# Patient Record
Sex: FEMALE | Race: WHITE | NOT HISPANIC OR LATINO | Employment: OTHER | URBAN - METROPOLITAN AREA
[De-identification: names, ages, dates, MRNs, and addresses within clinical notes are randomized per-mention and may not be internally consistent; named-entity substitution may affect disease eponyms.]

---

## 2023-05-05 LAB
ALANINE AMINOTRANSFERASE (SGPT) (U/L) IN SER/PLAS: 19 U/L (ref 7–45)
ALBUMIN (G/DL) IN SER/PLAS: 3.6 G/DL (ref 3.4–5)
ALKALINE PHOSPHATASE (U/L) IN SER/PLAS: 74 U/L (ref 33–136)
ANION GAP IN SER/PLAS: 11 MMOL/L (ref 10–20)
ASPARTATE AMINOTRANSFERASE (SGOT) (U/L) IN SER/PLAS: 29 U/L (ref 9–39)
BILIRUBIN TOTAL (MG/DL) IN SER/PLAS: 0.5 MG/DL (ref 0–1.2)
CALCIUM (MG/DL) IN SER/PLAS: 8.7 MG/DL (ref 8.6–10.3)
CARBON DIOXIDE, TOTAL (MMOL/L) IN SER/PLAS: 28 MMOL/L (ref 21–32)
CHLORIDE (MMOL/L) IN SER/PLAS: 107 MMOL/L (ref 98–107)
CHOLESTEROL (MG/DL) IN SER/PLAS: 129 MG/DL (ref 0–199)
CHOLESTEROL IN HDL (MG/DL) IN SER/PLAS: 37.6 MG/DL
CHOLESTEROL/HDL RATIO: 3.4
CREATININE (MG/DL) IN SER/PLAS: 0.81 MG/DL (ref 0.5–1.05)
ESTIMATED AVERAGE GLUCOSE FOR HBA1C: 148 MG/DL
GFR FEMALE: 77 ML/MIN/1.73M2
GLUCOSE (MG/DL) IN SER/PLAS: 126 MG/DL (ref 74–99)
HEMOGLOBIN A1C/HEMOGLOBIN TOTAL IN BLOOD: 6.8 %
LDL: 69 MG/DL (ref 0–99)
POTASSIUM (MMOL/L) IN SER/PLAS: 4.2 MMOL/L (ref 3.5–5.3)
PROTEIN TOTAL: 7 G/DL (ref 6.4–8.2)
SODIUM (MMOL/L) IN SER/PLAS: 142 MMOL/L (ref 136–145)
TRIGLYCERIDE (MG/DL) IN SER/PLAS: 111 MG/DL (ref 0–149)
UREA NITROGEN (MG/DL) IN SER/PLAS: 18 MG/DL (ref 6–23)
VLDL: 22 MG/DL (ref 0–40)

## 2023-05-10 PROBLEM — E78.5 DYSLIPIDEMIA, GOAL LDL BELOW 100: Status: ACTIVE | Noted: 2023-05-10

## 2023-05-10 PROBLEM — G56.30 RADIAL NERVE PALSY: Status: ACTIVE | Noted: 2023-05-10

## 2023-05-10 PROBLEM — M94.9 DISORDER OF BONE AND ARTICULAR CARTILAGE: Status: ACTIVE | Noted: 2023-05-10

## 2023-05-10 PROBLEM — F33.2 SEVERE EPISODE OF RECURRENT MAJOR DEPRESSIVE DISORDER, WITHOUT PSYCHOTIC FEATURES (MULTI): Status: ACTIVE | Noted: 2023-05-10

## 2023-05-10 PROBLEM — K74.69 CRYPTOGENIC CIRRHOSIS (MULTI): Status: ACTIVE | Noted: 2023-05-10

## 2023-05-10 PROBLEM — I25.10 ATHEROSCLEROTIC HEART DISEASE OF NATIVE CORONARY ARTERY WITHOUT ANGINA PECTORIS: Status: ACTIVE | Noted: 2023-05-10

## 2023-05-10 PROBLEM — M65.332 TRIGGER MIDDLE FINGER OF LEFT HAND: Status: ACTIVE | Noted: 2023-05-10

## 2023-05-10 PROBLEM — D36.14: Status: ACTIVE | Noted: 2023-05-10

## 2023-05-10 PROBLEM — K21.9 GERD (GASTROESOPHAGEAL REFLUX DISEASE): Status: ACTIVE | Noted: 2023-05-10

## 2023-05-10 PROBLEM — K57.90 DIVERTICULOSIS: Status: ACTIVE | Noted: 2023-05-10

## 2023-05-10 PROBLEM — S50.311A ABRASION OF RIGHT ELBOW: Status: ACTIVE | Noted: 2023-05-10

## 2023-05-10 PROBLEM — I89.0 LYMPHEDEMA OF RIGHT ARM: Status: ACTIVE | Noted: 2023-05-10

## 2023-05-10 PROBLEM — M17.0 PRIMARY OSTEOARTHRITIS OF BOTH KNEES: Status: ACTIVE | Noted: 2023-05-10

## 2023-05-10 PROBLEM — G56.01 CARPAL TUNNEL SYNDROME OF RIGHT WRIST: Status: ACTIVE | Noted: 2023-05-10

## 2023-05-10 PROBLEM — D49.2: Status: ACTIVE | Noted: 2023-05-10

## 2023-05-10 PROBLEM — J45.909 ASTHMATIC BRONCHITIS (HHS-HCC): Status: ACTIVE | Noted: 2023-05-10

## 2023-05-10 PROBLEM — H93.12 TINNITUS OF LEFT EAR: Status: ACTIVE | Noted: 2023-05-10

## 2023-05-10 PROBLEM — I10 BENIGN ESSENTIAL HYPERTENSION: Status: ACTIVE | Noted: 2023-05-10

## 2023-05-10 PROBLEM — G56.31 RADIAL NERVE PALSY, RIGHT: Status: ACTIVE | Noted: 2023-05-10

## 2023-05-10 PROBLEM — E11.65 TYPE 2 DIABETES MELLITUS WITH HYPERGLYCEMIA, WITHOUT LONG-TERM CURRENT USE OF INSULIN (MULTI): Status: ACTIVE | Noted: 2023-05-10

## 2023-05-10 PROBLEM — R91.1 INCIDENTAL LUNG NODULE, > 3MM AND < 8MM: Status: ACTIVE | Noted: 2023-05-10

## 2023-05-10 PROBLEM — R73.02 IGT (IMPAIRED GLUCOSE TOLERANCE): Status: ACTIVE | Noted: 2023-05-10
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
## 2023-05-10 PROBLEM — M89.9 DISORDER OF BONE AND ARTICULAR CARTILAGE: Status: ACTIVE | Noted: 2023-05-10

## 2023-05-10 PROBLEM — S20.211A CONTUSION OF RIB ON RIGHT SIDE: Status: ACTIVE | Noted: 2023-05-10

## 2023-05-10 RX ORDER — ASPIRIN 81 MG/1
1 TABLET ORAL DAILY
COMMUNITY
Start: 2017-02-23

## 2023-05-10 RX ORDER — LIDOCAINE 560 MG/1
1 PATCH PERCUTANEOUS; TOPICAL; TRANSDERMAL DAILY
COMMUNITY
Start: 2023-04-22

## 2023-05-10 RX ORDER — SERTRALINE HYDROCHLORIDE 100 MG/1
1 TABLET, FILM COATED ORAL DAILY
COMMUNITY
Start: 2020-07-09 | End: 2023-07-20 | Stop reason: SDUPTHER

## 2023-05-10 RX ORDER — IRBESARTAN 150 MG/1
1 TABLET ORAL DAILY
COMMUNITY
Start: 2019-10-16 | End: 2023-07-20 | Stop reason: SDUPTHER

## 2023-05-10 RX ORDER — PANTOPRAZOLE SODIUM 40 MG/1
1 TABLET, DELAYED RELEASE ORAL 2 TIMES DAILY
COMMUNITY
Start: 2019-11-14 | End: 2023-07-20 | Stop reason: SDUPTHER

## 2023-05-10 RX ORDER — PRAVASTATIN SODIUM 40 MG/1
1 TABLET ORAL DAILY
COMMUNITY
Start: 2017-03-21 | End: 2023-07-20 | Stop reason: SDUPTHER

## 2023-05-10 RX ORDER — CARVEDILOL 25 MG/1
1 TABLET ORAL
COMMUNITY
Start: 2016-09-17 | End: 2023-07-20 | Stop reason: SDUPTHER

## 2023-05-10 RX ORDER — AMLODIPINE BESYLATE 2.5 MG/1
1 TABLET ORAL DAILY
COMMUNITY
Start: 2018-12-12 | End: 2023-07-20 | Stop reason: SDUPTHER

## 2023-05-10 RX ORDER — DULOXETIN HYDROCHLORIDE 60 MG/1
1 CAPSULE, DELAYED RELEASE ORAL DAILY
COMMUNITY
Start: 2019-11-14 | End: 2023-07-20 | Stop reason: SDUPTHER

## 2023-05-11 ENCOUNTER — OFFICE VISIT (OUTPATIENT)
Dept: PRIMARY CARE | Facility: CLINIC | Age: 73
End: 2023-05-11
Payer: COMMERCIAL

## 2023-05-11 VITALS
SYSTOLIC BLOOD PRESSURE: 110 MMHG | HEART RATE: 64 BPM | DIASTOLIC BLOOD PRESSURE: 70 MMHG | HEIGHT: 59 IN | WEIGHT: 204 LBS | BODY MASS INDEX: 41.12 KG/M2

## 2023-05-11 DIAGNOSIS — E11.65 TYPE 2 DIABETES MELLITUS WITH HYPERGLYCEMIA, WITHOUT LONG-TERM CURRENT USE OF INSULIN (MULTI): Primary | ICD-10-CM

## 2023-05-11 DIAGNOSIS — R91.1 INCIDENTAL LUNG NODULE, > 3MM AND < 8MM: ICD-10-CM

## 2023-05-11 DIAGNOSIS — M81.0 POSTMENOPAUSAL BONE LOSS: ICD-10-CM

## 2023-05-11 DIAGNOSIS — K74.69 CRYPTOGENIC CIRRHOSIS (MULTI): ICD-10-CM

## 2023-05-11 DIAGNOSIS — Z12.31 VISIT FOR SCREENING MAMMOGRAM: ICD-10-CM

## 2023-05-11 DIAGNOSIS — K21.9 GASTROESOPHAGEAL REFLUX DISEASE WITHOUT ESOPHAGITIS: ICD-10-CM

## 2023-05-11 DIAGNOSIS — J45.30 MILD PERSISTENT ASTHMATIC BRONCHITIS WITHOUT COMPLICATION (HHS-HCC): ICD-10-CM

## 2023-05-11 DIAGNOSIS — Z12.11 COLON CANCER SCREENING: ICD-10-CM

## 2023-05-11 DIAGNOSIS — E66.01 CLASS 3 SEVERE OBESITY DUE TO EXCESS CALORIES WITH SERIOUS COMORBIDITY AND BODY MASS INDEX (BMI) OF 40.0 TO 44.9 IN ADULT (MULTI): ICD-10-CM

## 2023-05-11 DIAGNOSIS — F33.2 SEVERE EPISODE OF RECURRENT MAJOR DEPRESSIVE DISORDER, WITHOUT PSYCHOTIC FEATURES (MULTI): ICD-10-CM

## 2023-05-11 DIAGNOSIS — E78.5 DYSLIPIDEMIA, GOAL LDL BELOW 100: ICD-10-CM

## 2023-05-11 DIAGNOSIS — I10 BENIGN ESSENTIAL HYPERTENSION: ICD-10-CM

## 2023-05-11 PROBLEM — M94.9 DISORDER OF BONE AND ARTICULAR CARTILAGE: Status: RESOLVED | Noted: 2023-05-10 | Resolved: 2023-05-11

## 2023-05-11 PROBLEM — E66.813 CLASS 3 SEVERE OBESITY DUE TO EXCESS CALORIES WITH SERIOUS COMORBIDITY AND BODY MASS INDEX (BMI) OF 40.0 TO 44.9 IN ADULT: Status: ACTIVE | Noted: 2023-05-11

## 2023-05-11 PROBLEM — S50.311A ABRASION OF RIGHT ELBOW: Status: RESOLVED | Noted: 2023-05-10 | Resolved: 2023-05-11

## 2023-05-11 PROBLEM — R73.02 IGT (IMPAIRED GLUCOSE TOLERANCE): Status: RESOLVED | Noted: 2023-05-10 | Resolved: 2023-05-11

## 2023-05-11 PROBLEM — S20.211A CONTUSION OF RIB ON RIGHT SIDE: Status: RESOLVED | Noted: 2023-05-10 | Resolved: 2023-05-11

## 2023-05-11 PROBLEM — M89.9 DISORDER OF BONE AND ARTICULAR CARTILAGE: Status: RESOLVED | Noted: 2023-05-10 | Resolved: 2023-05-11
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
## 2023-05-11 PROCEDURE — 1036F TOBACCO NON-USER: CPT | Performed by: INTERNAL MEDICINE

## 2023-05-11 PROCEDURE — 99214 OFFICE O/P EST MOD 30 MIN: CPT | Performed by: INTERNAL MEDICINE

## 2023-05-11 PROCEDURE — 3078F DIAST BP <80 MM HG: CPT | Performed by: INTERNAL MEDICINE

## 2023-05-11 PROCEDURE — 1159F MED LIST DOCD IN RCRD: CPT | Performed by: INTERNAL MEDICINE

## 2023-05-11 PROCEDURE — 1160F RVW MEDS BY RX/DR IN RCRD: CPT | Performed by: INTERNAL MEDICINE

## 2023-05-11 PROCEDURE — 3074F SYST BP LT 130 MM HG: CPT | Performed by: INTERNAL MEDICINE

## 2023-05-11 PROCEDURE — 4010F ACE/ARB THERAPY RXD/TAKEN: CPT | Performed by: INTERNAL MEDICINE

## 2023-05-11 PROCEDURE — 3044F HG A1C LEVEL LT 7.0%: CPT | Performed by: INTERNAL MEDICINE

## 2023-05-11 PROCEDURE — 3008F BODY MASS INDEX DOCD: CPT | Performed by: INTERNAL MEDICINE

## 2023-05-11 ASSESSMENT — ANXIETY QUESTIONNAIRES
GAD7 TOTAL SCORE: 0
3. WORRYING TOO MUCH ABOUT DIFFERENT THINGS: NOT AT ALL
5. BEING SO RESTLESS THAT IT IS HARD TO SIT STILL: NOT AT ALL
6. BECOMING EASILY ANNOYED OR IRRITABLE: NOT AT ALL
IF YOU CHECKED OFF ANY PROBLEMS ON THIS QUESTIONNAIRE, HOW DIFFICULT HAVE THESE PROBLEMS MADE IT FOR YOU TO DO YOUR WORK, TAKE CARE OF THINGS AT HOME, OR GET ALONG WITH OTHER PEOPLE: NOT DIFFICULT AT ALL
1. FEELING NERVOUS, ANXIOUS, OR ON EDGE: NOT AT ALL
7. FEELING AFRAID AS IF SOMETHING AWFUL MIGHT HAPPEN: NOT AT ALL
4. TROUBLE RELAXING: NOT AT ALL
2. NOT BEING ABLE TO STOP OR CONTROL WORRYING: NOT AT ALL

## 2023-05-11 ASSESSMENT — PATIENT HEALTH QUESTIONNAIRE - PHQ9
1. LITTLE INTEREST OR PLEASURE IN DOING THINGS: NOT AT ALL
2. FEELING DOWN, DEPRESSED OR HOPELESS: NOT AT ALL
SUM OF ALL RESPONSES TO PHQ9 QUESTIONS 1 AND 2: 0

## 2023-05-11 ASSESSMENT — COLUMBIA-SUICIDE SEVERITY RATING SCALE - C-SSRS
6. HAVE YOU EVER DONE ANYTHING, STARTED TO DO ANYTHING, OR PREPARED TO DO ANYTHING TO END YOUR LIFE?: NO
2. HAVE YOU ACTUALLY HAD ANY THOUGHTS OF KILLING YOURSELF?: NO
1. IN THE PAST MONTH, HAVE YOU WISHED YOU WERE DEAD OR WISHED YOU COULD GO TO SLEEP AND NOT WAKE UP?: NO

## 2023-05-11 ASSESSMENT — ENCOUNTER SYMPTOMS
LOSS OF SENSATION IN FEET: 0
DEPRESSION: 0
OCCASIONAL FEELINGS OF UNSTEADINESS: 0

## 2023-05-11 NOTE — PROGRESS NOTES
"Subjective   Reason for Visit: Purnima Puente is an 72 y.o. female here for a  visit.     Past Medical, Surgical, and Family History reviewed and updated in chart.    Reviewed all medications by prescribing practitioner or clinical pharmacist (such as prescriptions, OTCs, herbal therapies and supplements) and documented in the medical record.    Patient's  recently admitted to skilled nursing facility after suffering from recurrent stroke with worsening vascular dementia not able to care for self still insists on driving patient in chronic state of depression as difficulty with family contributing to help situation recently sustained a fall at home accidental doing okay at this time was evaluated emergency department on April 24 after fall no significant injuries noted        Patient Care Team:  Chevy Onofre DO as PCP - General  Chevy Onofre DO as PCP - [employer] PCP     Review of Systems   All other systems reviewed and are negative.      Objective   Vitals:  /70   Pulse 64   Ht 1.499 m (4' 11\")   Wt 92.5 kg (204 lb)   BMI 41.20 kg/m²       Physical Exam  Vitals and nursing note reviewed.   Constitutional:       General: She is not in acute distress.     Appearance: Normal appearance. She is well-developed. She is not toxic-appearing.   HENT:      Head: Normocephalic and atraumatic.      Right Ear: Tympanic membrane and external ear normal.      Left Ear: Tympanic membrane and external ear normal.      Nose: Nose normal.      Mouth/Throat:      Mouth: Mucous membranes are moist.      Pharynx: Oropharynx is clear. No oropharyngeal exudate or posterior oropharyngeal erythema.      Tonsils: No tonsillar exudate. 2+ on the right. 2+ on the left.   Eyes:      Extraocular Movements: Extraocular movements intact.      Conjunctiva/sclera: Conjunctivae normal.   Cardiovascular:      Rate and Rhythm: Normal rate and regular rhythm.      Pulses: Normal pulses.      Heart sounds: " Normal heart sounds. No murmur heard.  Pulmonary:      Effort: Pulmonary effort is normal.      Breath sounds: Normal breath sounds.   Abdominal:      General: Abdomen is flat. Bowel sounds are normal.      Palpations: Abdomen is soft.   Musculoskeletal:      Cervical back: Neck supple.   Lymphadenopathy:      Cervical: No cervical adenopathy.   Skin:     General: Skin is warm and dry.      Findings: No rash.   Neurological:      Mental Status: She is alert. Mental status is at baseline.   Psychiatric:         Mood and Affect: Mood normal.         Behavior: Behavior normal.         Thought Content: Thought content normal.         Judgment: Judgment normal.         Assessment/Plan   Problem List Items Addressed This Visit          Respiratory    Asthmatic bronchitis    Relevant Orders    BI mammo bilateral screening tomosynthesis    Follow Up In [facility] - PCP    BI mammo bilateral screening tomosynthesis    XR DEXA bone density    Fecal Occult Blood Immunoassay    Comprehensive Metabolic Panel    Hemoglobin A1C    Lipid Panel    Albumin , Urine Random    Incidental lung nodule, > 3mm and < 8mm    Relevant Orders    BI mammo bilateral screening tomosynthesis    Follow Up In [facility] - PCP    BI mammo bilateral screening tomosynthesis    XR DEXA bone density    Fecal Occult Blood Immunoassay    Comprehensive Metabolic Panel    Hemoglobin A1C    Lipid Panel    Albumin , Urine Random       Circulatory    Benign essential hypertension    Relevant Orders    BI mammo bilateral screening tomosynthesis    Follow Up [facility] - PCP    BI mammo bilateral screening tomosynthesis    XR DEXA bone density    Fecal Occult Blood Immunoassay    Comprehensive Metabolic Panel    Hemoglobin A1C    Lipid Panel    Albumin , Urine Random       Digestive    Cryptogenic cirrhosis (CMS/HCC)    Current Assessment & Plan     No signs of decompensated cirrhosis repeat liver function stable at this time          Relevant Orders    BI mammo bilateral screening tomosynthesis    Follow Up In [facility] - PCP    BI mammo bilateral screening tomosynthesis    XR DEXA bone density    Fecal Occult Blood Immunoassay    Comprehensive Metabolic Panel    Hemoglobin A1C    Lipid Panel    Albumin , Urine Random    GERD (gastroesophageal reflux disease)    Relevant Orders    BI mammo bilateral screening tomosynthesis    Follow Up In [facility] - PCP    BI mammo bilateral screening tomosynthesis    XR DEXA bone density    Fecal Occult Blood Immunoassay    Comprehensive Metabolic Panel    Hemoglobin A1C    Lipid Panel    Albumin , Urine Random       Musculoskeletal    Postmenopausal bone loss    Relevant Orders    BI mammo bilateral screening tomosynthesis    Follow Up In [facility] - PCP    BI mammo bilateral screening tomosynthesis    XR DEXA bone density    Fecal Occult Blood Immunoassay    Comprehensive Metabolic Panel    Hemoglobin A1C    Lipid Panel    Albumin , Urine Random       Endocrine/Metabolic    Type 2 diabetes mellitus with hyperglycemia, without long-term current use of insulin (CMS/[facility]) - Primary    Current Assessment & Plan     Hemoglobin A1c 6.8 continues with diet control at this time reevaluate in 6 months         Relevant Orders    BI mammo bilateral screening tomosynthesis    Follow Up In [facility] - PCP    BI mammo bilateral screening tomosynthesis    XR DEXA bone density    Fecal Occult Blood Immunoassay    Comprehensive Metabolic Panel    Hemoglobin A1C    Lipid Panel    Albumin , Urine Random    Class 3 severe obesity due to excess calories with serious comorbidity and body mass index (BMI) of 40.0 to 44.9 in adult (CMS/[facility])    Current Assessment & Plan     Continue to aggressively work on management of morbid obesity through diet and exercise consider adding GLP-1 agonist therapy if covered in the setting of diabetes mellitus            Other    Dyslipidemia,  goal LDL below 100    Current Assessment & Plan     LDL cholesterol favorable at 69 continue pravastatin 40 mg daily         Relevant Orders    BI mammo bilateral screening tomosynthesis    Follow Up In [facility] - PCP    BI mammo bilateral screening tomosynthesis    XR DEXA bone density    Fecal Occult Blood Immunoassay    Comprehensive Metabolic Panel    Hemoglobin A1C    Lipid Panel    Albumin , Urine Random    Severe episode of recurrent major depressive disorder, without psychotic features (CMS/HCC)    Current Assessment & Plan     Currently in remission on sertraline 100 mg daily         Relevant Orders    BI mammo bilateral screening tomosynthesis    Follow Up In [facility] - PCP    BI mammo bilateral screening tomosynthesis    XR DEXA bone density    Fecal Occult Blood Immunoassay    Comprehensive Metabolic Panel    Hemoglobin A1C    Lipid Panel    Albumin , Urine Random    Visit for screening mammogram    Current Assessment & Plan     Schedule annual screening mammogram         Relevant Orders    BI mammo bilateral screening tomosynthesis    Follow Up In [facility] - PCP    BI mammo bilateral screening tomosynthesis    XR DEXA bone density    Fecal Occult Blood Immunoassay    Comprehensive Metabolic Panel    Hemoglobin A1C    Lipid Panel    Albumin , Urine Random    Colon cancer screening    Current Assessment & Plan     Repeat fit test for colon cancer screening         Relevant Orders    BI mammo bilateral screening tomosynthesis    Follow Up In [facility] - PCP    BI mammo bilateral screening tomosynthesis    XR DEXA bone density    Fecal Occult Blood Immunoassay    Comprehensive Metabolic Panel    Hemoglobin A1C    Lipid Panel    Albumin , Urine Random

## 2023-05-11 NOTE — PROGRESS NOTES
"Subjective   Patient ID: Purnima Puente is a 72 y.o. female who presents for No chief complaint on file..    HPI     Review of Systems    Objective   /70   Pulse 64   Ht 1.499 m (4' 11\")   Wt 92.5 kg (204 lb)   BMI 41.20 kg/m²     Physical Exam    Assessment/Plan   {Assess/PlanSmartLinks:13752}       "

## 2023-05-11 NOTE — PROGRESS NOTES
"Subjective   Patient ID: Purnima Puente is a 72 y.o. female who presents for Follow-up.    HPI     Review of Systems    Objective   /70   Pulse 64   Ht 1.499 m (4' 11\")   Wt 92.5 kg (204 lb)   BMI 41.20 kg/m²     Physical Exam    Assessment/Plan          "

## 2023-05-11 NOTE — ASSESSMENT & PLAN NOTE
Continue to aggressively work on management of morbid obesity through diet and exercise consider adding GLP-1 agonist therapy if covered in the setting of diabetes mellitus

## 2023-07-20 DIAGNOSIS — I10 BENIGN ESSENTIAL HYPERTENSION: Primary | ICD-10-CM

## 2023-07-20 DIAGNOSIS — E78.5 DYSLIPIDEMIA, GOAL LDL BELOW 100: ICD-10-CM

## 2023-07-20 DIAGNOSIS — F33.2 SEVERE EPISODE OF RECURRENT MAJOR DEPRESSIVE DISORDER, WITHOUT PSYCHOTIC FEATURES (MULTI): ICD-10-CM

## 2023-07-20 DIAGNOSIS — K21.9 GASTROESOPHAGEAL REFLUX DISEASE WITHOUT ESOPHAGITIS: ICD-10-CM

## 2023-07-20 RX ORDER — IRBESARTAN 150 MG/1
150 TABLET ORAL DAILY
Qty: 90 TABLET | Refills: 3 | Status: SHIPPED | OUTPATIENT
Start: 2023-07-20

## 2023-07-20 RX ORDER — AMLODIPINE BESYLATE 2.5 MG/1
2.5 TABLET ORAL DAILY
Qty: 90 TABLET | Refills: 3 | Status: SHIPPED | OUTPATIENT
Start: 2023-07-20

## 2023-07-20 RX ORDER — SERTRALINE HYDROCHLORIDE 100 MG/1
100 TABLET, FILM COATED ORAL DAILY
Qty: 90 TABLET | Refills: 3 | Status: SHIPPED | OUTPATIENT
Start: 2023-07-20

## 2023-07-20 RX ORDER — CARVEDILOL 25 MG/1
25 TABLET ORAL
Qty: 180 TABLET | Refills: 3 | Status: SHIPPED | OUTPATIENT
Start: 2023-07-20

## 2023-07-20 RX ORDER — DULOXETIN HYDROCHLORIDE 60 MG/1
60 CAPSULE, DELAYED RELEASE ORAL DAILY
Qty: 90 CAPSULE | Refills: 3 | Status: SHIPPED | OUTPATIENT
Start: 2023-07-20

## 2023-07-20 RX ORDER — PRAVASTATIN SODIUM 40 MG/1
40 TABLET ORAL DAILY
Qty: 90 TABLET | Refills: 3 | Status: SHIPPED | OUTPATIENT
Start: 2023-07-20

## 2023-07-20 RX ORDER — PANTOPRAZOLE SODIUM 40 MG/1
40 TABLET, DELAYED RELEASE ORAL 2 TIMES DAILY
Qty: 180 TABLET | Refills: 3 | Status: SHIPPED | OUTPATIENT
Start: 2023-07-20

## 2023-09-01 ENCOUNTER — CLINICAL SUPPORT (OUTPATIENT)
Dept: PRIMARY CARE | Facility: CLINIC | Age: 73
End: 2023-09-01
Payer: MEDICARE

## 2023-09-01 PROCEDURE — G0008 ADMIN INFLUENZA VIRUS VAC: HCPCS | Performed by: INTERNAL MEDICINE

## 2023-09-01 PROCEDURE — 90662 IIV NO PRSV INCREASED AG IM: CPT | Performed by: INTERNAL MEDICINE

## 2023-09-06 NOTE — PROGRESS NOTES
Subjective   Patient ID: Purnima Puente is a 72 y.o. female who presents for Nurse Visit (Flu shot).    HPI     Review of Systems    Objective   There were no vitals taken for this visit.    Physical Exam    Assessment/Plan

## 2023-11-02 ENCOUNTER — LAB (OUTPATIENT)
Dept: LAB | Facility: LAB | Age: 73
End: 2023-11-02
Payer: MEDICARE

## 2023-11-02 DIAGNOSIS — Z12.11 COLON CANCER SCREENING: ICD-10-CM

## 2023-11-02 DIAGNOSIS — F33.2 SEVERE EPISODE OF RECURRENT MAJOR DEPRESSIVE DISORDER, WITHOUT PSYCHOTIC FEATURES (MULTI): ICD-10-CM

## 2023-11-02 DIAGNOSIS — Z12.31 VISIT FOR SCREENING MAMMOGRAM: ICD-10-CM

## 2023-11-02 DIAGNOSIS — E78.5 DYSLIPIDEMIA, GOAL LDL BELOW 100: ICD-10-CM

## 2023-11-02 DIAGNOSIS — E11.65 TYPE 2 DIABETES MELLITUS WITH HYPERGLYCEMIA, WITHOUT LONG-TERM CURRENT USE OF INSULIN (MULTI): ICD-10-CM

## 2023-11-02 DIAGNOSIS — R91.1 INCIDENTAL LUNG NODULE, > 3MM AND < 8MM: ICD-10-CM

## 2023-11-02 DIAGNOSIS — K21.9 GASTROESOPHAGEAL REFLUX DISEASE WITHOUT ESOPHAGITIS: ICD-10-CM

## 2023-11-02 DIAGNOSIS — I10 BENIGN ESSENTIAL HYPERTENSION: ICD-10-CM

## 2023-11-02 DIAGNOSIS — M81.0 POSTMENOPAUSAL BONE LOSS: ICD-10-CM

## 2023-11-02 DIAGNOSIS — K74.69 CRYPTOGENIC CIRRHOSIS (MULTI): ICD-10-CM

## 2023-11-02 DIAGNOSIS — J45.30 MILD PERSISTENT ASTHMATIC BRONCHITIS WITHOUT COMPLICATION (HHS-HCC): ICD-10-CM

## 2023-11-02 LAB
ALBUMIN SERPL BCP-MCNC: 3.6 G/DL (ref 3.4–5)
ALP SERPL-CCNC: 77 U/L (ref 33–136)
ALT SERPL W P-5'-P-CCNC: 16 U/L (ref 7–45)
ANION GAP SERPL CALC-SCNC: 14 MMOL/L (ref 10–20)
AST SERPL W P-5'-P-CCNC: 27 U/L (ref 9–39)
BILIRUB SERPL-MCNC: 0.5 MG/DL (ref 0–1.2)
BUN SERPL-MCNC: 16 MG/DL (ref 6–23)
CALCIUM SERPL-MCNC: 8.5 MG/DL (ref 8.6–10.3)
CHLORIDE SERPL-SCNC: 105 MMOL/L (ref 98–107)
CHOLEST SERPL-MCNC: 116 MG/DL (ref 0–199)
CHOLESTEROL/HDL RATIO: 3.2
CO2 SERPL-SCNC: 24 MMOL/L (ref 21–32)
CREAT SERPL-MCNC: 0.71 MG/DL (ref 0.5–1.05)
EST. AVERAGE GLUCOSE BLD GHB EST-MCNC: 148 MG/DL
GFR SERPL CREATININE-BSD FRML MDRD: 90 ML/MIN/1.73M*2
GLUCOSE SERPL-MCNC: 108 MG/DL (ref 74–99)
HBA1C MFR BLD: 6.8 %
HDLC SERPL-MCNC: 35.7 MG/DL
LDLC SERPL CALC-MCNC: 56 MG/DL
NON HDL CHOLESTEROL: 80 MG/DL (ref 0–149)
POTASSIUM SERPL-SCNC: 4.7 MMOL/L (ref 3.5–5.3)
PROT SERPL-MCNC: 7.2 G/DL (ref 6.4–8.2)
SODIUM SERPL-SCNC: 138 MMOL/L (ref 136–145)
TRIGL SERPL-MCNC: 123 MG/DL (ref 0–149)
VLDL: 25 MG/DL (ref 0–40)

## 2023-11-02 PROCEDURE — 80053 COMPREHEN METABOLIC PANEL: CPT

## 2023-11-02 PROCEDURE — 36415 COLL VENOUS BLD VENIPUNCTURE: CPT

## 2023-11-02 PROCEDURE — 83036 HEMOGLOBIN GLYCOSYLATED A1C: CPT

## 2023-11-02 PROCEDURE — 80061 LIPID PANEL: CPT

## 2023-11-13 ENCOUNTER — OFFICE VISIT (OUTPATIENT)
Dept: PRIMARY CARE | Facility: CLINIC | Age: 73
End: 2023-11-13
Payer: MEDICARE

## 2023-11-13 VITALS
HEIGHT: 59 IN | HEART RATE: 68 BPM | BODY MASS INDEX: 40.72 KG/M2 | WEIGHT: 202 LBS | DIASTOLIC BLOOD PRESSURE: 80 MMHG | SYSTOLIC BLOOD PRESSURE: 122 MMHG

## 2023-11-13 DIAGNOSIS — K21.9 GASTROESOPHAGEAL REFLUX DISEASE WITHOUT ESOPHAGITIS: ICD-10-CM

## 2023-11-13 DIAGNOSIS — R91.1 INCIDENTAL LUNG NODULE, > 3MM AND < 8MM: ICD-10-CM

## 2023-11-13 DIAGNOSIS — J45.30 MILD PERSISTENT ASTHMATIC BRONCHITIS WITHOUT COMPLICATION (HHS-HCC): ICD-10-CM

## 2023-11-13 DIAGNOSIS — K74.69 CRYPTOGENIC CIRRHOSIS (MULTI): ICD-10-CM

## 2023-11-13 DIAGNOSIS — Z12.31 VISIT FOR SCREENING MAMMOGRAM: ICD-10-CM

## 2023-11-13 DIAGNOSIS — E78.5 DYSLIPIDEMIA, GOAL LDL BELOW 100: ICD-10-CM

## 2023-11-13 DIAGNOSIS — E66.01 CLASS 3 SEVERE OBESITY DUE TO EXCESS CALORIES WITH SERIOUS COMORBIDITY AND BODY MASS INDEX (BMI) OF 40.0 TO 44.9 IN ADULT (MULTI): ICD-10-CM

## 2023-11-13 DIAGNOSIS — I10 BENIGN ESSENTIAL HYPERTENSION: ICD-10-CM

## 2023-11-13 DIAGNOSIS — D36.14 SCHWANNOMA OF NERVE OF CHEST: ICD-10-CM

## 2023-11-13 DIAGNOSIS — E11.65 TYPE 2 DIABETES MELLITUS WITH HYPERGLYCEMIA, WITHOUT LONG-TERM CURRENT USE OF INSULIN (MULTI): Primary | ICD-10-CM

## 2023-11-13 DIAGNOSIS — R91.1 INCIDENTAL PULMONARY NODULE, GREATER THAN OR EQUAL TO 8MM: ICD-10-CM

## 2023-11-13 DIAGNOSIS — F33.2 SEVERE EPISODE OF RECURRENT MAJOR DEPRESSIVE DISORDER, WITHOUT PSYCHOTIC FEATURES (MULTI): ICD-10-CM

## 2023-11-13 PROCEDURE — 1159F MED LIST DOCD IN RCRD: CPT | Performed by: INTERNAL MEDICINE

## 2023-11-13 PROCEDURE — 3079F DIAST BP 80-89 MM HG: CPT | Performed by: INTERNAL MEDICINE

## 2023-11-13 PROCEDURE — 3074F SYST BP LT 130 MM HG: CPT | Performed by: INTERNAL MEDICINE

## 2023-11-13 PROCEDURE — 99214 OFFICE O/P EST MOD 30 MIN: CPT | Performed by: INTERNAL MEDICINE

## 2023-11-13 PROCEDURE — 1036F TOBACCO NON-USER: CPT | Performed by: INTERNAL MEDICINE

## 2023-11-13 PROCEDURE — 3008F BODY MASS INDEX DOCD: CPT | Performed by: INTERNAL MEDICINE

## 2023-11-13 PROCEDURE — 1160F RVW MEDS BY RX/DR IN RCRD: CPT | Performed by: INTERNAL MEDICINE

## 2023-11-13 PROCEDURE — 3048F LDL-C <100 MG/DL: CPT | Performed by: INTERNAL MEDICINE

## 2023-11-13 PROCEDURE — 3044F HG A1C LEVEL LT 7.0%: CPT | Performed by: INTERNAL MEDICINE

## 2023-11-13 PROCEDURE — 4010F ACE/ARB THERAPY RXD/TAKEN: CPT | Performed by: INTERNAL MEDICINE

## 2023-11-13 ASSESSMENT — PATIENT HEALTH QUESTIONNAIRE - PHQ9
6. FEELING BAD ABOUT YOURSELF - OR THAT YOU ARE A FAILURE OR HAVE LET YOURSELF OR YOUR FAMILY DOWN: NEARLY EVERY DAY
SUM OF ALL RESPONSES TO PHQ9 QUESTIONS 1 AND 2: 3
2. FEELING DOWN, DEPRESSED OR HOPELESS: NEARLY EVERY DAY
1. LITTLE INTEREST OR PLEASURE IN DOING THINGS: NOT AT ALL
10. IF YOU CHECKED OFF ANY PROBLEMS, HOW DIFFICULT HAVE THESE PROBLEMS MADE IT FOR YOU TO DO YOUR WORK, TAKE CARE OF THINGS AT HOME, OR GET ALONG WITH OTHER PEOPLE: SOMEWHAT DIFFICULT
5. POOR APPETITE OR OVEREATING: NEARLY EVERY DAY
SUM OF ALL RESPONSES TO PHQ QUESTIONS 1-9: 19
3. TROUBLE FALLING OR STAYING ASLEEP OR SLEEPING TOO MUCH: NEARLY EVERY DAY
8. MOVING OR SPEAKING SO SLOWLY THAT OTHER PEOPLE COULD HAVE NOTICED. OR THE OPPOSITE, BEING SO FIGETY OR RESTLESS THAT YOU HAVE BEEN MOVING AROUND A LOT MORE THAN USUAL: NOT AT ALL
9. THOUGHTS THAT YOU WOULD BE BETTER OFF DEAD, OR OF HURTING YOURSELF: SEVERAL DAYS
7. TROUBLE CONCENTRATING ON THINGS, SUCH AS READING THE NEWSPAPER OR WATCHING TELEVISION: NEARLY EVERY DAY
4. FEELING TIRED OR HAVING LITTLE ENERGY: NEARLY EVERY DAY

## 2023-11-13 ASSESSMENT — ANXIETY QUESTIONNAIRES
2. NOT BEING ABLE TO STOP OR CONTROL WORRYING: SEVERAL DAYS
GAD7 TOTAL SCORE: 12
7. FEELING AFRAID AS IF SOMETHING AWFUL MIGHT HAPPEN: MORE THAN HALF THE DAYS
1. FEELING NERVOUS, ANXIOUS, OR ON EDGE: NEARLY EVERY DAY
6. BECOMING EASILY ANNOYED OR IRRITABLE: NEARLY EVERY DAY
IF YOU CHECKED OFF ANY PROBLEMS ON THIS QUESTIONNAIRE, HOW DIFFICULT HAVE THESE PROBLEMS MADE IT FOR YOU TO DO YOUR WORK, TAKE CARE OF THINGS AT HOME, OR GET ALONG WITH OTHER PEOPLE: SOMEWHAT DIFFICULT
3. WORRYING TOO MUCH ABOUT DIFFERENT THINGS: SEVERAL DAYS
4. TROUBLE RELAXING: SEVERAL DAYS
5. BEING SO RESTLESS THAT IT IS HARD TO SIT STILL: SEVERAL DAYS

## 2023-11-13 NOTE — ASSESSMENT & PLAN NOTE
Blood pressures well controlled continueCarvedilol 25 mg twice a day with amlodipine 2.5 mg daily and irbesartan 150 mg daily

## 2023-11-13 NOTE — ASSESSMENT & PLAN NOTE
Recent stress test and echocardiogram reveals no evidence of ischemia continue to monitor with cardiologist

## 2023-11-13 NOTE — ASSESSMENT & PLAN NOTE
LDL cholesterol optimal at 56 with HDL 35 and triglyceride level of 123 continue pravastatin 40 mg daily

## 2023-11-13 NOTE — ASSESSMENT & PLAN NOTE
2 pound weight loss since last visit BMI of 40 work to achieve weight goal of 195 pounds through diet lifestyle changes

## 2023-11-13 NOTE — PATIENT INSTRUCTIONS

## 2023-11-13 NOTE — PROGRESS NOTES
"Subjective   Patient ID: Purnima Puente is a 73 y.o. female who presents for Follow-up.    HPI     Review of Systems    Objective   /80 (BP Location: Right arm, Patient Position: Sitting)   Pulse 68   Ht 1.499 m (4' 11\")   Wt 91.6 kg (202 lb)   BMI 40.80 kg/m²     Physical Exam    Assessment/Plan          "

## 2023-11-13 NOTE — PROGRESS NOTES
"Subjective   Reason for Visit: Purnima Puente is an 73 y.o. female here for a  visit.     Past Medical, Surgical, and Family History reviewed and updated in chart.    Reviewed all medications by prescribing practitioner or clinical pharmacist (such as prescriptions, OTCs, herbal therapies and supplements) and documented in the medical record.    HPI    Patient Care Team:  Chevy Onofre DO as PCP - General  Chevy Onofre DO as PCP - [employer] PCP     Review of Systems   All other systems reviewed and are negative.      Objective   Vitals:  /80 (BP Location: Right arm, Patient Position: Sitting)   Pulse 68   Ht 1.499 m (4' 11\")   Wt 91.6 kg (202 lb)   BMI 40.80 kg/m²       Physical Exam  Vitals and nursing note reviewed.   Constitutional:       General: She is not in acute distress.     Appearance: Normal appearance. She is well-developed. She is obese. She is not toxic-appearing.   HENT:      Head: Normocephalic and atraumatic.      Right Ear: Tympanic membrane and external ear normal.      Left Ear: Tympanic membrane and external ear normal.      Nose: Nose normal.      Mouth/Throat:      Mouth: Mucous membranes are moist.      Pharynx: Oropharynx is clear. No oropharyngeal exudate or posterior oropharyngeal erythema.      Tonsils: No tonsillar exudate. 2+ on the right. 2+ on the left.   Eyes:      Extraocular Movements: Extraocular movements intact.      Conjunctiva/sclera: Conjunctivae normal.   Cardiovascular:      Rate and Rhythm: Normal rate and regular rhythm.      Pulses: Normal pulses.      Heart sounds: Normal heart sounds. No murmur heard.  Pulmonary:      Effort: Pulmonary effort is normal.      Breath sounds: Normal breath sounds.   Abdominal:      General: Abdomen is flat. Bowel sounds are normal.      Palpations: Abdomen is soft.   Musculoskeletal:      Cervical back: Neck supple.   Lymphadenopathy:      Cervical: No cervical adenopathy.   Skin:     General: " Skin is warm and dry.      Findings: No rash.   Neurological:      Mental Status: She is alert. Mental status is at baseline.   Psychiatric:         Mood and Affect: Mood normal.         Behavior: Behavior normal.         Thought Content: Thought content normal.         Judgment: Judgment normal.         Assessment/Plan   Problem List Items Addressed This Visit       Asthmatic bronchitis    Current Assessment & Plan     Symptoms well controlled continue         Relevant Orders    Follow Up In [facility] - PCP - Established    Comprehensive Metabolic Panel    Hemoglobin A1C    Lipid Panel    Albumin , Urine Random    Benign essential hypertension    Current Assessment & Plan     Blood pressures well controlled continueCarvedilol 25 mg twice a day with amlodipine 2.5 mg daily and irbesartan 150 mg daily         Relevant Orders    Follow Up In [facility] - PCP - Established    Comprehensive Metabolic Panel    Hemoglobin A1C    Lipid Panel    Albumin , Urine Random    Cryptogenic cirrhosis (CMS/HCC)    Current Assessment & Plan     No signs of symptoms of decompensated cirrhosis or elevated liver functions at this time         Relevant Orders    Follow Up In [facility] - PCP - Established    Comprehensive Metabolic Panel    Hemoglobin A1C    Lipid Panel    Albumin , Urine Random    Dyslipidemia, goal LDL below 100    Current Assessment & Plan     LDL cholesterol optimal at 56 with HDL 35 and triglyceride level of 123 continue pravastatin 40 mg daily         Relevant Orders    Follow Up In [facility] - PCP - Established    Comprehensive Metabolic Panel    Hemoglobin A1C    Lipid Panel    Albumin , Urine Random    GERD (gastroesophageal reflux disease)    Relevant Orders    Follow Up In [facility] - PCP - Established    Comprehensive Metabolic Panel    Hemoglobin A1C    Lipid Panel    Albumin , Urine Random    Incidental pulmonary nodule, greater than or equal to 8mm     Current Assessment & Plan     Last evaluated stable in May 2020 2 repeat study with noncontrast CT         Relevant Orders    CT chest wo IV contrast    Schwannoma of nerve of chest    Relevant Orders    CT chest wo IV contrast    Severe episode of recurrent major depressive disorder, without psychotic features (CMS/[facility])    Relevant Orders    Follow Up In [facility] - PCP - Established    Comprehensive Metabolic Panel    Hemoglobin A1C    Lipid Panel    Albumin , Urine Random    Type 2 diabetes mellitus with hyperglycemia, without long-term current use of insulin (CMS/[facility]) - Primary    Current Assessment & Plan     Hemoglobin A1c improved to 6.8 with fasting blood sugar of 108 continue to manage with diet control at this time         Relevant Orders    Follow Up In [facility] - PCP - Established    Comprehensive Metabolic Panel    Hemoglobin A1C    Lipid Panel    Albumin , Urine Random    Visit for screening mammogram    Current Assessment & Plan     Mammogram completed in August stable continue with annual follow-up         Relevant Orders    Follow Up In [facility] - PCP - Established    Comprehensive Metabolic Panel    Hemoglobin A1C    Lipid Panel    Albumin , Urine Random    Class 3 severe obesity due to excess calories with serious comorbidity and body mass index (BMI) of 40.0 to 44.9 in adult (CMS/[facility])    Current Assessment & Plan     2 pound weight loss since last visit BMI of 40 work to achieve weight goal of 195 pounds through diet lifestyle changes             Patient was identified as a fall risk. Risk prevention instructions provided.

## 2023-11-13 NOTE — ASSESSMENT & PLAN NOTE
Hemoglobin A1c improved to 6.8 with fasting blood sugar of 108 continue to manage with diet control at this time

## 2024-03-15 ENCOUNTER — LAB (OUTPATIENT)
Dept: LAB | Facility: LAB | Age: 74
End: 2024-03-15
Payer: COMMERCIAL

## 2024-03-15 ENCOUNTER — TELEPHONE (OUTPATIENT)
Dept: CARDIOLOGY | Facility: CLINIC | Age: 74
End: 2024-03-15

## 2024-03-15 DIAGNOSIS — F33.2 SEVERE EPISODE OF RECURRENT MAJOR DEPRESSIVE DISORDER, WITHOUT PSYCHOTIC FEATURES (MULTI): ICD-10-CM

## 2024-03-15 DIAGNOSIS — Z12.31 VISIT FOR SCREENING MAMMOGRAM: ICD-10-CM

## 2024-03-15 DIAGNOSIS — J45.30 MILD PERSISTENT ASTHMATIC BRONCHITIS WITHOUT COMPLICATION (HHS-HCC): ICD-10-CM

## 2024-03-15 DIAGNOSIS — R91.1 INCIDENTAL LUNG NODULE, > 3MM AND < 8MM: ICD-10-CM

## 2024-03-15 DIAGNOSIS — K21.9 GASTROESOPHAGEAL REFLUX DISEASE WITHOUT ESOPHAGITIS: ICD-10-CM

## 2024-03-15 DIAGNOSIS — I10 BENIGN ESSENTIAL HYPERTENSION: Primary | ICD-10-CM

## 2024-03-15 DIAGNOSIS — K74.69 CRYPTOGENIC CIRRHOSIS (MULTI): ICD-10-CM

## 2024-03-15 DIAGNOSIS — E78.5 DYSLIPIDEMIA, GOAL LDL BELOW 100: ICD-10-CM

## 2024-03-15 DIAGNOSIS — E11.65 TYPE 2 DIABETES MELLITUS WITH HYPERGLYCEMIA, WITHOUT LONG-TERM CURRENT USE OF INSULIN (MULTI): ICD-10-CM

## 2024-03-15 DIAGNOSIS — I10 BENIGN ESSENTIAL HYPERTENSION: ICD-10-CM

## 2024-03-15 NOTE — TELEPHONE ENCOUNTER
3/15/24  1148  After reviewing Dr. Turner' last office note placed order for a BMP and mg++.      Called and informed patient of labs ordered and to have them done 1-2 days before her appt with Dr. Butts.      Patient verbalized understanding.        ----- Message from Janell Espinoza sent at 3/15/2024 11:16 AM EDT -----  Regarding: Blood work orders  Can you put blood work orders in  if need. She has paperwork for 03/15/24.  Please let her know when done [phone].

## 2024-03-17 NOTE — PROGRESS NOTES
[facility]    Patient Name: Purnima Puente  Patient : 1950      Scribe Attestation  By signing my name below, INina Scribe   attest that this documentation has been prepared under the direction and in the presence of Jesu Butts DO.      Reason for visit:  This is a 73-year-old female here for follow-up regarding her history of moderate coronary artery disease as seen on cardiac catheterization done in May 2015, hypertension, dyslipidemia, and severe obesity.      HPI:  This is a 73-year-old female here for follow-up regarding her history of moderate coronary artery disease as seen on cardiac catheterization done in May 2015, hypertension, dyslipidemia, and severe obesity.  The patient was last evaluated by me in 2023.  At that visit I suggested a long-acting nitrate which the patient declined, ordered blood work including BMP/magnesium be drawn in 6 months, and asked the patient to follow-up in 6 months and sooner if necessary.  BMP done in 2024 showed normal serum sodium and potassium with a serum creatinine 0.90, serum magnesium was 1.77. Hemoglobin A1c done in 2023 was 6.8%.  Lipid panel done in 2023 showed an LDL cholesterol 56 and triglycerides of 123 while on pravastatin 40 mg daily. Nuclear stress done in 2023 showed a small fixed perfusion defect in the apical wall which resolved on prone imaging suggesting soft tissue attenuation, low probability of ischemia, calculated ejection fraction of 53%. Echocardiogram done in 2023 showed low normal left ventricular systolic function with an ejection fraction of 50 to 55%, normal right ventricular systolic function, mild dilatation of the ascending aorta measured at 4.0 cm, and no significant valve abnormalities. ECG done today showed sinus rhythm with a heart rate of 87 bpm. The patient reports intermittent lower extremity edema. She denies  any aggravating factors to her edema. She also has some shortness of breath which had been unchanged since the last visit. She denies any new chest pain, palpitations and lightheadedness.  She states that she takes all of her medications as prescribed. During my exam, she was resting comfortably on the exam table.            Assessment/Plan:   1. Coronary artery disease  The patient has a history of moderate coronary artery disease as seen on cardiac catheterization done in May 2015.  ECG done today showed sinus rhythm with a heart rate of 87 bpm.  She denies anginal chest dicomfort but reports some shortness of breath which is unchanged from the last visit.  Shortness of breath appears to be multifactorial which includes aging, weight gain and deconditioning.   Blood pressure appears controlled on exam today.   She should continue current antihypertensive medications and antiplatelet therapy.  Nuclear stress done in August 2023 showed a small fixed perfusion defect in the apical wall which resolved on prone imaging suggesting soft tissue attenuation, low probability of ischemia, calculated ejection fraction of 53%.   Echocardiogram done in August 2023 showed low normal left ventricular systolic function with an ejection fraction of 50 to 55%, normal right ventricular systolic function, mild dilatation of the ascending aorta measured at 4.0 cm, and no significant valve abnormalities.  Recent lab works as noted in the HPI.  Lipid panel done in November 2023 showed an LDL cholesterol 56 and triglycerides of 123 while on pravastatin 40 mg daily.  Echocardiogram will be updated in 6 months.  Please see lifestyle recommendations below.  Follow up in 1 year and sooner if necessary.     2.  Thoracic ascending aortic aneurysm  The patient was noted to have thoracic ascending aortic aneurysm seen on recent echocardiogram.  Echocardiogram done in August 2023 showed low normal left ventricular systolic function with an  ejection fraction of 50 to 55%, normal right ventricular systolic function, mild dilatation of the ascending aorta measured at 4.0 cm, and no significant valve abnormalities.  Echocardiogram will be updated in 6 months.    3. Hypertension  The patient has a history of hypertension and blood pressure appears controlled on exam today.   She should continue her current antihypertensive medications.      4. Dyslipidemia  Lipid panel done in November 2023 showed an LDL cholesterol 56 and triglycerides of 123 while on pravastatin 40 mg daily.  Please see lifestyle recommendations below.      5. Severe obesity  Please see lifestyle recommendations below.      6. Lower extremity edema  The patient reports having intermittent lower extremity edema.  She was noted to trace pitting bilateral lower extremity edema on exam today.  I discussed with her the importance of following a low-sodium heart healthy diet as well as weight loss, wearing compression stockings and elevating legs when seated.        Orders:   Echocardiogram in 6 months   Follow-up in 1 year.     Lifestyle Recommendations  I recommend a whole-food plant-based diet, an eating pattern that encourages the consumption of unrefined plant foods (such as fruits, vegetables, tubers, whole grains, legumes, nuts and seeds) and discourages meats, dairy products, eggs and processed foods.     The AHA/ACC recommends that the patient consume a dietary pattern that emphasizes intake of vegetables, fruits, and whole grains; includes low-fat dairy products, poultry, fish, legumes, non-tropical vegetable oils, and nuts; and limits intake of sodium, sweets, sugar-sweetened beverages, and red meats.  Adapt this dietary pattern to appropriate calorie requirements (a 500-750 kcal/day deficit to loose weight), personal and cultural food preferences, and nutrition therapy for other medical conditions (including diabetes).  Achieve this pattern by following plans such as the [place]  Mediterranean, DASH dietary pattern, or AHA diet.     Engage in 2 hours and 30 minutes per week of moderate-intensity physical activity, or 1 hour and 15 minutes (75 minutes) per week of vigorous-intensity aerobic physical activity, or an equivalent combination of moderate and vigorous-intensity aerobic physical activity. Aerobic activity should be performed in episodes of at least 10 minutes preferably spread throughout the week.     Adhering to a heart healthy diet, regular exercise habits, avoidance of tobacco products, and maintenance of a healthy weight are crucial components of their heart disease risk reduction.     Any positive review of systems not specifically addressed in the office visit today should be evaluated and treated by the patients primary care physician or in an emergency department if necessary     Patient was notified that results from ordered tests will be called to the patient if it changes current management; it will otherwise be discussed at a future appointment and available on  [email or web address].     Thank you for allowing me to participate in the care of this patient.        This document was generated using the assistance of voice recognition software. If there are any errors of spelling, grammar, syntax, or meaning; please feel free to contact me directly for clarification.    Past Medical History:  She has a past medical history of Allergic contact dermatitis due to other chemical products (03/08/2021), Asthma, Calculus of bile duct with chronic cholecystitis without obstruction (02/11/2021), Calculus of bile duct without cholangitis or cholecystitis without obstruction (02/04/2021), Chronic obstructive pulmonary disease with (acute) exacerbation (CMS/[facility]) (07/09/2020), Diverticulitis of intestine, part unspecified, without perforation or abscess without bleeding (04/02/2020), Encounter for general adult medical examination without abnormal findings (11/14/2019), Encounter for general adult  medical examination without abnormal findings (04/06/2022), Encounter for other preprocedural examination (09/18/2020), Left ventricular failure, unspecified (CMS/HCC) (10/16/2019), Lesion of radial nerve, right upper limb (07/29/2022), Localized swelling, mass and lump, right upper limb (10/27/2020), Morbid (severe) obesity due to excess calories (CMS/HCC) (06/09/2021), Other symptoms and signs involving the genitourinary system (09/18/2020), Other symptoms and signs involving the musculoskeletal system (09/02/2021), Pain in right elbow (07/24/2020), Pain in right shoulder (01/25/2021), Personal history of neoplasm of uncertain behavior (07/24/2020), Personal history of other diseases of the circulatory system, Personal history of other diseases of the circulatory system, Personal history of other diseases of the circulatory system, Personal history of other diseases of the circulatory system, Personal history of other diseases of the nervous system and sense organs, Personal history of other diseases of the respiratory system, Personal history of other endocrine, nutritional and metabolic disease (09/15/2020), Personal history of other specified conditions (02/04/2021), Personal history of other specified conditions (04/02/2020), Personal history of other specified conditions (01/08/2021), Personal history of other specified conditions (10/31/2017), Personal history of urinary (tract) infections (09/08/2020), Portal vein thrombosis (02/11/2021), Portal vein thrombosis (02/04/2021), Primary pulmonary hypertension (CMS/HCC) (02/04/2021), Rash and other nonspecific skin eruption (01/08/2021), Right upper quadrant abdominal tenderness (01/08/2021), Spondylosis without myelopathy or radiculopathy, cervical region, Stiffness of right shoulder, not elsewhere classified (02/02/2021), Strain of unspecified achilles tendon, initial encounter, Syncope and collapse, and Urinary tract infection, site not specified  (09/11/2020).    Past Surgical History:  She has a past surgical history that includes Hysterectomy (02/23/2017); Bladder surgery (02/23/2017); Other surgical history (09/29/2021); Other surgical history (07/24/2020); Other surgical history (10/28/2020); Other surgical history (06/09/2021); Other surgical history (08/06/2020); Other surgical history (11/14/2019); and Other surgical history (11/14/2019).      Social History:  She reports that she has never smoked. She has never used smokeless tobacco. She reports that she does not currently use alcohol. She reports that she does not use drugs.    Family History:  Family History   Problem Relation Name Age of Onset    Coronary artery disease Other      Breast cancer Other      Heart attack Other      Psoriasis Other      Other (CABG) Other      Colonic polyp Other          Allergies:  Diphenhydramine hcl, Ciprofloxacin, Phenytoin sodium extended, Sulfa (sulfonamide antibiotics), Amitriptyline, Bupropion, Chlordiazepoxide, Doxycycline, Enoxaparin, Erythromycin, Heparin, Hydromorphone, Hydroxyzine pamoate, Latex, Nitrofurantoin macrocrystal, Penicillins, Statins-hmg-coa reductase inhibitors, Sumatriptan, and Terbinafine    Outpatient Medications:  Current Outpatient Medications   Medication Instructions    amLODIPine (NORVASC) 2.5 mg, oral, Daily    aspirin 81 mg EC tablet 1 tablet, oral, Daily    carvedilol (COREG) 25 mg, oral, 2 times daily with meals    DULoxetine (CYMBALTA) 60 mg, oral, Daily    irbesartan (AVAPRO) 150 mg, oral, Daily    lidocaine 4 % patch 1 patch, transdermal, Daily    pantoprazole (PROTONIX) 40 mg, oral, 2 times daily    pravastatin (PRAVACHOL) 40 mg, oral, Daily    sertraline (ZOLOFT) 100 mg, oral, Daily        ROS:  A 14 point review of systems was done and is negative other than as stated in HPI    Vitals:      7/29/2022    10:21 AM 10/24/2022     9:41 AM 1/30/2023    10:19 AM 5/11/2023    12:16 PM 8/3/2023    10:11 AM 9/15/2023    11:53 AM  "11/13/2023    10:35 AM   Vitals   Systolic 116  110 110 108 124 122   Diastolic 74  60 70 70 66 80   Heart Rate 70  74 64 82 83 68   Resp 16  16       Height (in) 1.499 m (4' 11\") 1.499 m (4' 11\") 1.499 m (4' 11\") 1.499 m (4' 11\") 1.499 m (4' 11\") 1.499 m (4' 11\") 1.499 m (4' 11\")   Weight (lb) 191 191 196 204 199.13 200.38 202   BMI 38.58 kg/m2 38.58 kg/m2 39.59 kg/m2 41.2 kg/m2 40.22 kg/m2 40.47 kg/m2 40.8 kg/m2   BSA (m2) 1.9 m2 1.9 m2 1.92 m2 1.96 m2 1.94 m2 1.95 m2 1.95 m2        Physical Exam:   Constitutional: Cooperative, in no acute distress, alert, appears stated age.   Skin: Skin color, texture, turgor normal. No rashes or lesions.   Head: Normocephalic. No masses, lesions, tenderness or abnormalities   Eyes: Extraocular movements are grossly intact.   Mouth and throat: Mucous membranes moist   Neck: Neck supple, no carotid bruits, no JVD   Respiratory: Lungs clear to auscultation, no wheezing or rhonchi, no use of accessory muscles   Chest wall: No scars, normal excursion with respiration   Cardiovascular: Regular rhythm without murmur, gallop, or rubs   Gastrointestinal: Abdomen soft, nontender. Bowel sounds normal. Severely obese.  Musculoskeletal: Strength equal in upper extremities   Extremities: Trace pitting edema   Neurologic: Sensation grossly intact, alert and oriented x3        Intake/Output:   No intake/output data recorded.    Outpatient Medications  Current Outpatient Medications on File Prior to Visit   Medication Sig Dispense Refill    amLODIPine (Norvasc) 2.5 mg tablet Take 1 tablet (2.5 mg) by mouth once daily. 90 tablet 3    aspirin 81 mg EC tablet Take 1 tablet (81 mg) by mouth once daily.      carvedilol (Coreg) 25 mg tablet Take 1 tablet (25 mg) by mouth 2 times a day with meals. 180 tablet 3    DULoxetine (Cymbalta) 60 mg DR capsule Take 1 capsule (60 mg) by mouth once daily. 90 capsule 3    irbesartan (Avapro) 150 mg tablet Take 1 tablet (150 mg) by mouth once daily. 90 tablet 3    " lidocaine 4 % patch Place 1 patch on the skin once daily.      pantoprazole (ProtoNix) 40 mg EC tablet Take 1 tablet (40 mg) by mouth 2 times a day. 180 tablet 3    pravastatin (Pravachol) 40 mg tablet Take 1 tablet (40 mg) by mouth once daily. 90 tablet 3    sertraline (Zoloft) 100 mg tablet Take 1 tablet (100 mg) by mouth once daily. 90 tablet 3     No current facility-administered medications on file prior to visit.       Labs: (past 26 weeks)  Recent Results (from the past 4368 hour(s))   Comprehensive Metabolic Panel    Collection Time: 11/02/23 11:24 AM   Result Value Ref Range    Glucose 108 (H) 74 - 99 mg/dL    Sodium 138 136 - 145 mmol/L    Potassium 4.7 3.5 - 5.3 mmol/L    Chloride 105 98 - 107 mmol/L    Bicarbonate 24 21 - 32 mmol/L    Anion Gap 14 10 - 20 mmol/L    Urea Nitrogen 16 6 - 23 mg/dL    Creatinine 0.71 0.50 - 1.05 mg/dL    eGFR 90 >60 mL/min/1.73m*2    Calcium 8.5 (L) 8.6 - 10.3 mg/dL    Albumin 3.6 3.4 - 5.0 g/dL    Alkaline Phosphatase 77 33 - 136 U/L    Total Protein 7.2 6.4 - 8.2 g/dL    AST 27 9 - 39 U/L    Bilirubin, Total 0.5 0.0 - 1.2 mg/dL    ALT 16 7 - 45 U/L   Hemoglobin A1C    Collection Time: 11/02/23 11:24 AM   Result Value Ref Range    Hemoglobin A1C 6.8 (H) see below %    Estimated Average Glucose 148 Not Established mg/dL   Lipid Panel    Collection Time: 11/02/23 11:24 AM   Result Value Ref Range    Cholesterol 116 0 - 199 mg/dL    HDL-Cholesterol 35.7 mg/dL    Cholesterol/HDL Ratio 3.2     LDL Calculated 56 <=99 mg/dL    VLDL 25 0 - 40 mg/dL    Triglycerides 123 0 - 149 mg/dL    Non HDL Cholesterol 80 0 - 149 mg/dL       ECG  No results found for this or any previous visit (from the past 4464 hour(s)).    Echocardiogram  No results found for this or any previous visit from the past 1095 days.      CV Studies:  EKG: No results found for this or any previous visit (from the past 4464 hour(s)).  Echocardiogram:   Echocardiogram     Narrative  [facility]  [address]  [place] [id number]  Phone [phone] Fax [phone]    TRANSTHORACIC ECHOCARDIOGRAM REPORT      Patient Name:     CHARLIE MEZA           Gilda Physician:  [id number] [address] JIMENEZ  Study Date:       8/29/2023            Referring           SARA CALDWELL  Physician:  MRN/PID:          [id number]             PCP:  Accession/Order#: [id number]         [facility] Echo Lab  Location:  YOB: 1950           Fellow:  Gender:           F                    Nurse:              Marya Collado RN  Admit Date:                            Sonographer:        Gilda Hernandez [facility]  Admission Status: Outpatient           Additional Staff:  Height:           149.86 cm            CC Report to:  Weight:           90.26 kg             Study Type:         Echocardiogram  BSA:              1.84 m2  Blood Pressure: 108 /70 mmHg    Diagnosis/ICD: I25.10-Atherosclerotic heart disease of native coronary artery  without angina pectoris; I10-Essential (primary) hypertension  Indication:    CAD,HTN  Procedure/CPT: Echo Complete w Full Doppler-[id number]    Patient History:  Pertinent History: CAD.    Study Detail: The following Echo studies were performed: 2D, M-Mode, Doppler and  color flow. Technically challenging study due to body habitus and  prominent lung artifact. Optison used as a contrast agent for  endocardial border definition.      PHYSICIAN INTERPRETATION:  Left Ventricle: Left ventricular systolic function is normal, with an estimated ejection fraction of 50-55%. There are no regional wall motion abnormalities. The left ventricular cavity size is normal. Spectral Doppler shows a normal pattern of left ventricular diastolic filling.  Left Atrium: The left atrium is normal in size.  Right Ventricle: The right ventricle is normal in size. There is normal right ventricular global systolic function.  Right Atrium: The right atrium is normal in size.  Aortic Valve: The aortic valve  appears structurally normal. There is no evidence of aortic valve regurgitation. The peak instantaneous gradient of the aortic valve is 9.8 mmHg. The mean gradient of the aortic valve is 5.0 mmHg.  Mitral Valve: The mitral valve is normal in structure. There is no evidence of mitral valve regurgitation.  Tricuspid Valve: The tricuspid valve is structurally normal. There is trace tricuspid regurgitation.  Pulmonic Valve: The pulmonic valve is not well visualized. There is trace pulmonic valve regurgitation.  Pericardium: There is no pericardial effusion noted.  Aorta: The aortic root is normal. There is mild dilatation of the ascending aorta.      CONCLUSIONS:  1. Left ventricular systolic function is normal with a 50-55% estimated ejection fraction.    QUANTITATIVE DATA SUMMARY:  2D MEASUREMENTS:  Normal Ranges:  LAs:           4.07 cm    (2.7-4.0cm)  IVSd:          1.12 cm    (0.6-1.1cm)  LVPWd:         0.95 cm    (0.6-1.1cm)  LVIDd:         5.01 cm    (3.9-5.9cm)  LVIDs:         3.82 cm  LV Mass Index: 104.3 g/m2  LV % FS        23.6 %    LA VOLUME:  Normal Ranges:  LA Vol A4C:        50.3 ml    (22+/-6mL/m2)  LA Vol A2C:        28.2 ml  LA Vol BP:         41.8 ml  LA Vol Index A4C:  27.3 ml/m2  LA Vol Index A2C:  15.3 ml/m2  LA Vol Index BP:   22.7 ml/m2  LA Area A4C:       19.3 cm2  LA Area A2C:       13.0 cm2  LA Major Axis A4C: 6.3 cm  LA Major Axis A2C: 5.1 cm  LA Vol A4C:        49.4 ml  LA Vol A2C:        26.1 ml    RA VOLUME BY A/L METHOD:  Normal Ranges:  RA Area A4C: 13.0 cm2    AORTA MEASUREMENTS:  Normal Ranges:  Ao Sinus, d: 3.00 cm (2.1-3.5cm)  Ao STJ, d:   2.20 cm (1.7-3.4cm)  Asc Ao, d:   4.00 cm (2.1-3.4cm)    LV SYSTOLIC FUNCTION BY 2D PLANIMETRY (MOD):  Normal Ranges:  EF-A4C View: 50.9 % (>=55%)  EF-A2C View: 43.8 %  EF-Biplane:  48.0 %    LV DIASTOLIC FUNCTION:  Normal Ranges:  MV Peak E:    0.94 m/s    (0.7-1.2 m/s)  MV Peak A:    0.94 m/s    (0.42-0.7 m/s)  E/A Ratio:    1.00         (1.0-2.2)  MV e'         0.06 m/s    (>8.0)  MV lateral e' 0.06 m/s  MV medial e'  0.07 m/s  MV A Dur:     134.16 msec  E/e' Ratio:   14.44       (<8.0)    MITRAL VALVE:  Normal Ranges:  MV DT: 221 msec (150-240msec)    AORTIC VALVE:  Normal Ranges:  AoV Vmax:                1.56 m/s (<=1.7m/s)  AoV Peak P.8 mmHg (<20mmHg)  AoV Mean P.0 mmHg (1.7-11.5mmHg)  LVOT Max Terrence:            1.18 m/s (<=1.1m/s)  AoV VTI:                 32.34 cm (18-25cm)  LVOT VTI:                26.14 cm  LVOT Diameter:           1.96 cm  (1.8-2.4cm)  AoV Area, VTI:           2.44 cm2 (2.5-5.5cm2)  AoV Area,Vmax:           2.28 cm2 (2.5-4.5cm2)  AoV Dimensionless Index: 0.81      RIGHT VENTRICLE:  RV Basal 3.58 cm  RV Mid   2.50 cm  RV Major 7.6 cm  TAPSE:   20.6 mm  RV s'    0.12 m/s    TRICUSPID VALVE/RVSP:  Normal Ranges:  IVC Diam: 1.14 cm  TV e'     0.1 m/s    AORTA:  Asc Ao Diam 3.97 cm      [id number] Homer Gage MD  Electronically signed on 2023 at 9:58:52 AM         Final     Stress Testing IMESULT([id number]): No results found for this or any previous visit from the past 1825 days.    Cardiac Catheterization: No results found for this or any previous visit from the past  days.  No results found for this or any previous visit from the past 3650 days.     Cardiac Scoring: No results found for this or any previous visit from the past  days.    AAA : No results found for this or any previous visit from the past 1825 days.    OTHER: No results found for this or any previous visit from the past  days.    LAST IMAGING RESULTS  ELECTROCARDIOGRAM RHYTHM STRIP  Ordered by an unspecified provider.    Problem List Items Addressed This Visit       Atherosclerotic heart disease of native coronary artery without angina pectoris - Primary    Relevant Orders    ECG 12 Lead (Completed)    Benign essential hypertension    Relevant Orders    ECG 12 Lead (Completed)    Dyslipidemia, goal LDL below 100     Relevant Orders    ECG 12 Lead (Completed)    Class 3 severe obesity due to excess calories with serious comorbidity and body mass index (BMI) of 40.0 to 44.9 in adult (CMS/[facility])    Relevant Orders    ECG 12 Lead (Completed)    Thoracic ascending aortic aneurysm (CMS/[facility])    Relevant Orders    ECG 12 Lead (Completed)    Bilateral lower extremity edema     Other Visit Diagnoses       Lower extremity edema                    Jesu Butts DO, FACC, FACOI

## 2024-03-18 ENCOUNTER — LAB (OUTPATIENT)
Dept: LAB | Facility: LAB | Age: 74
End: 2024-03-18
Payer: COMMERCIAL

## 2024-03-18 DIAGNOSIS — I10 BENIGN ESSENTIAL HYPERTENSION: ICD-10-CM

## 2024-03-18 LAB
ANION GAP SERPL CALC-SCNC: 10 MMOL/L (ref 10–20)
BUN SERPL-MCNC: 13 MG/DL (ref 6–23)
CALCIUM SERPL-MCNC: 8.4 MG/DL (ref 8.6–10.3)
CHLORIDE SERPL-SCNC: 104 MMOL/L (ref 98–107)
CO2 SERPL-SCNC: 29 MMOL/L (ref 21–32)
CREAT SERPL-MCNC: 0.9 MG/DL (ref 0.5–1.05)
EGFRCR SERPLBLD CKD-EPI 2021: 68 ML/MIN/1.73M*2
GLUCOSE SERPL-MCNC: 159 MG/DL (ref 74–99)
MAGNESIUM SERPL-MCNC: 1.77 MG/DL (ref 1.6–2.4)
POTASSIUM SERPL-SCNC: 4.3 MMOL/L (ref 3.5–5.3)
SODIUM SERPL-SCNC: 139 MMOL/L (ref 136–145)

## 2024-03-18 PROCEDURE — 80048 BASIC METABOLIC PNL TOTAL CA: CPT

## 2024-03-18 PROCEDURE — 83735 ASSAY OF MAGNESIUM: CPT

## 2024-03-18 PROCEDURE — 36415 COLL VENOUS BLD VENIPUNCTURE: CPT

## 2024-03-22 ENCOUNTER — OFFICE VISIT (OUTPATIENT)
Dept: CARDIOLOGY | Facility: CLINIC | Age: 74
End: 2024-03-22
Payer: COMMERCIAL

## 2024-03-22 VITALS
WEIGHT: 197.2 LBS | HEART RATE: 87 BPM | HEIGHT: 59 IN | BODY MASS INDEX: 39.76 KG/M2 | SYSTOLIC BLOOD PRESSURE: 124 MMHG | DIASTOLIC BLOOD PRESSURE: 72 MMHG

## 2024-03-22 DIAGNOSIS — R60.0 LOWER EXTREMITY EDEMA: ICD-10-CM

## 2024-03-22 DIAGNOSIS — I25.10 ATHEROSCLEROSIS OF NATIVE CORONARY ARTERY OF NATIVE HEART WITHOUT ANGINA PECTORIS: Primary | ICD-10-CM

## 2024-03-22 DIAGNOSIS — R60.0 BILATERAL LOWER EXTREMITY EDEMA: ICD-10-CM

## 2024-03-22 DIAGNOSIS — I71.21 ANEURYSM OF ASCENDING AORTA WITHOUT RUPTURE (CMS-HCC): ICD-10-CM

## 2024-03-22 DIAGNOSIS — I10 BENIGN ESSENTIAL HYPERTENSION: ICD-10-CM

## 2024-03-22 DIAGNOSIS — E78.5 DYSLIPIDEMIA, GOAL LDL BELOW 100: ICD-10-CM

## 2024-03-22 DIAGNOSIS — E66.01 CLASS 3 SEVERE OBESITY DUE TO EXCESS CALORIES WITH SERIOUS COMORBIDITY AND BODY MASS INDEX (BMI) OF 40.0 TO 44.9 IN ADULT (MULTI): ICD-10-CM

## 2024-03-22 PROCEDURE — 4010F ACE/ARB THERAPY RXD/TAKEN: CPT | Performed by: INTERNAL MEDICINE

## 2024-03-22 PROCEDURE — 1036F TOBACCO NON-USER: CPT | Performed by: INTERNAL MEDICINE

## 2024-03-22 PROCEDURE — 99214 OFFICE O/P EST MOD 30 MIN: CPT | Performed by: INTERNAL MEDICINE

## 2024-03-22 PROCEDURE — 3078F DIAST BP <80 MM HG: CPT | Performed by: INTERNAL MEDICINE

## 2024-03-22 PROCEDURE — 3008F BODY MASS INDEX DOCD: CPT | Performed by: INTERNAL MEDICINE

## 2024-03-22 PROCEDURE — 1159F MED LIST DOCD IN RCRD: CPT | Performed by: INTERNAL MEDICINE

## 2024-03-22 PROCEDURE — 93000 ELECTROCARDIOGRAM COMPLETE: CPT | Performed by: INTERNAL MEDICINE

## 2024-03-22 PROCEDURE — 3074F SYST BP LT 130 MM HG: CPT | Performed by: INTERNAL MEDICINE

## 2024-03-22 RX ORDER — MELOXICAM 15 MG/1
TABLET ORAL
COMMUNITY
Start: 2019-11-14

## 2024-03-22 RX ORDER — LISINOPRIL 10 MG/1
TABLET ORAL EVERY 12 HOURS
COMMUNITY
End: 2024-05-13 | Stop reason: WASHOUT

## 2024-03-22 RX ORDER — BUDESONIDE AND FORMOTEROL FUMARATE DIHYDRATE 160; 4.5 UG/1; UG/1
AEROSOL RESPIRATORY (INHALATION) EVERY 12 HOURS
COMMUNITY
Start: 2020-06-11

## 2024-03-22 RX ORDER — CITALOPRAM 10 MG/1
TABLET ORAL EVERY 24 HOURS
COMMUNITY

## 2024-04-07 ENCOUNTER — HOSPITAL ENCOUNTER (OUTPATIENT)
Dept: RADIOLOGY | Facility: HOSPITAL | Age: 74
Discharge: HOME | End: 2024-04-07
Payer: COMMERCIAL

## 2024-04-07 DIAGNOSIS — D36.14 SCHWANNOMA OF NERVE OF CHEST: ICD-10-CM

## 2024-04-07 DIAGNOSIS — R91.1 INCIDENTAL PULMONARY NODULE, GREATER THAN OR EQUAL TO 8MM: ICD-10-CM

## 2024-04-07 PROCEDURE — 71250 CT THORAX DX C-: CPT

## 2024-04-07 PROCEDURE — 71250 CT THORAX DX C-: CPT | Performed by: RADIOLOGY

## 2024-05-06 ENCOUNTER — LAB (OUTPATIENT)
Dept: LAB | Facility: LAB | Age: 74
End: 2024-05-06
Payer: COMMERCIAL

## 2024-05-06 DIAGNOSIS — R91.1 INCIDENTAL LUNG NODULE, > 3MM AND < 8MM: ICD-10-CM

## 2024-05-06 DIAGNOSIS — F33.2 SEVERE EPISODE OF RECURRENT MAJOR DEPRESSIVE DISORDER, WITHOUT PSYCHOTIC FEATURES (MULTI): ICD-10-CM

## 2024-05-06 DIAGNOSIS — K74.69 CRYPTOGENIC CIRRHOSIS (MULTI): ICD-10-CM

## 2024-05-06 DIAGNOSIS — K21.9 GASTROESOPHAGEAL REFLUX DISEASE WITHOUT ESOPHAGITIS: ICD-10-CM

## 2024-05-06 DIAGNOSIS — I10 BENIGN ESSENTIAL HYPERTENSION: ICD-10-CM

## 2024-05-06 DIAGNOSIS — E78.5 DYSLIPIDEMIA, GOAL LDL BELOW 100: ICD-10-CM

## 2024-05-06 DIAGNOSIS — J45.30 MILD PERSISTENT ASTHMATIC BRONCHITIS WITHOUT COMPLICATION (HHS-HCC): ICD-10-CM

## 2024-05-06 DIAGNOSIS — Z12.31 VISIT FOR SCREENING MAMMOGRAM: ICD-10-CM

## 2024-05-06 DIAGNOSIS — E11.65 TYPE 2 DIABETES MELLITUS WITH HYPERGLYCEMIA, WITHOUT LONG-TERM CURRENT USE OF INSULIN (MULTI): ICD-10-CM

## 2024-05-06 LAB
ALBUMIN SERPL BCP-MCNC: 3.5 G/DL (ref 3.4–5)
ALP SERPL-CCNC: 81 U/L (ref 33–136)
ALT SERPL W P-5'-P-CCNC: 17 U/L (ref 7–45)
ANION GAP SERPL CALC-SCNC: 11 MMOL/L (ref 10–20)
AST SERPL W P-5'-P-CCNC: 29 U/L (ref 9–39)
BILIRUB SERPL-MCNC: 0.5 MG/DL (ref 0–1.2)
BUN SERPL-MCNC: 13 MG/DL (ref 6–23)
CALCIUM SERPL-MCNC: 8.3 MG/DL (ref 8.6–10.3)
CHLORIDE SERPL-SCNC: 107 MMOL/L (ref 98–107)
CHOLEST SERPL-MCNC: 112 MG/DL (ref 0–199)
CHOLESTEROL/HDL RATIO: 3.3
CO2 SERPL-SCNC: 27 MMOL/L (ref 21–32)
CREAT SERPL-MCNC: 0.65 MG/DL (ref 0.5–1.05)
CREAT UR-MCNC: 128.5 MG/DL (ref 20–320)
EGFRCR SERPLBLD CKD-EPI 2021: >90 ML/MIN/1.73M*2
EST. AVERAGE GLUCOSE BLD GHB EST-MCNC: 151 MG/DL
GLUCOSE SERPL-MCNC: 157 MG/DL (ref 74–99)
HBA1C MFR BLD: 6.9 %
HDLC SERPL-MCNC: 33.9 MG/DL
LDLC SERPL CALC-MCNC: 55 MG/DL
MICROALBUMIN UR-MCNC: 12.9 MG/L
MICROALBUMIN/CREAT UR: 10 UG/MG CREAT
NON HDL CHOLESTEROL: 78 MG/DL (ref 0–149)
POTASSIUM SERPL-SCNC: 3.9 MMOL/L (ref 3.5–5.3)
PROT SERPL-MCNC: 6.9 G/DL (ref 6.4–8.2)
SODIUM SERPL-SCNC: 141 MMOL/L (ref 136–145)
TRIGL SERPL-MCNC: 117 MG/DL (ref 0–149)
VLDL: 23 MG/DL (ref 0–40)

## 2024-05-06 PROCEDURE — 80061 LIPID PANEL: CPT

## 2024-05-06 PROCEDURE — 82570 ASSAY OF URINE CREATININE: CPT

## 2024-05-06 PROCEDURE — 82043 UR ALBUMIN QUANTITATIVE: CPT

## 2024-05-06 PROCEDURE — 83036 HEMOGLOBIN GLYCOSYLATED A1C: CPT

## 2024-05-06 PROCEDURE — 36415 COLL VENOUS BLD VENIPUNCTURE: CPT

## 2024-05-06 PROCEDURE — 80053 COMPREHEN METABOLIC PANEL: CPT

## 2024-05-13 ENCOUNTER — OFFICE VISIT (OUTPATIENT)
Dept: PRIMARY CARE | Facility: CLINIC | Age: 74
End: 2024-05-13
Payer: COMMERCIAL

## 2024-05-13 VITALS
BODY MASS INDEX: 39.51 KG/M2 | HEART RATE: 64 BPM | WEIGHT: 196 LBS | SYSTOLIC BLOOD PRESSURE: 120 MMHG | HEIGHT: 59 IN | DIASTOLIC BLOOD PRESSURE: 60 MMHG

## 2024-05-13 DIAGNOSIS — E66.01 CLASS 2 SEVERE OBESITY DUE TO EXCESS CALORIES WITH SERIOUS COMORBIDITY AND BODY MASS INDEX (BMI) OF 39.0 TO 39.9 IN ADULT (MULTI): ICD-10-CM

## 2024-05-13 DIAGNOSIS — I11.0 HYPERTENSIVE HEART DISEASE WITH CHRONIC DIASTOLIC CONGESTIVE HEART FAILURE (MULTI): ICD-10-CM

## 2024-05-13 DIAGNOSIS — I50.32 HYPERTENSIVE HEART DISEASE WITH CHRONIC DIASTOLIC CONGESTIVE HEART FAILURE (MULTI): ICD-10-CM

## 2024-05-13 DIAGNOSIS — J45.30 MILD PERSISTENT ASTHMATIC BRONCHITIS WITHOUT COMPLICATION (HHS-HCC): ICD-10-CM

## 2024-05-13 DIAGNOSIS — F32.5 MAJOR DEPRESSION IN REMISSION (CMS-HCC): ICD-10-CM

## 2024-05-13 DIAGNOSIS — E78.5 DYSLIPIDEMIA ASSOCIATED WITH TYPE 2 DIABETES MELLITUS (MULTI): ICD-10-CM

## 2024-05-13 DIAGNOSIS — Z12.11 COLON CANCER SCREENING: ICD-10-CM

## 2024-05-13 DIAGNOSIS — K74.69 CRYPTOGENIC CIRRHOSIS (MULTI): ICD-10-CM

## 2024-05-13 DIAGNOSIS — E11.69 DYSLIPIDEMIA ASSOCIATED WITH TYPE 2 DIABETES MELLITUS (MULTI): ICD-10-CM

## 2024-05-13 DIAGNOSIS — Z12.31 VISIT FOR SCREENING MAMMOGRAM: ICD-10-CM

## 2024-05-13 DIAGNOSIS — Z00.00 MEDICARE ANNUAL WELLNESS VISIT, SUBSEQUENT: Primary | ICD-10-CM

## 2024-05-13 DIAGNOSIS — D36.14 SCHWANNOMA OF NERVE OF CHEST: ICD-10-CM

## 2024-05-13 DIAGNOSIS — E11.65 TYPE 2 DIABETES MELLITUS WITH HYPERGLYCEMIA, WITHOUT LONG-TERM CURRENT USE OF INSULIN (MULTI): ICD-10-CM

## 2024-05-13 DIAGNOSIS — I71.21 ANEURYSM OF ASCENDING AORTA WITHOUT RUPTURE (CMS-HCC): ICD-10-CM

## 2024-05-13 PROBLEM — G56.30 RADIAL NERVE PALSY: Status: RESOLVED | Noted: 2023-05-10 | Resolved: 2024-05-13

## 2024-05-13 PROBLEM — R60.0 BILATERAL LOWER EXTREMITY EDEMA: Status: RESOLVED | Noted: 2024-03-22 | Resolved: 2024-05-13

## 2024-05-13 PROBLEM — M81.0 POSTMENOPAUSAL BONE LOSS: Status: RESOLVED | Noted: 2023-05-11 | Resolved: 2024-05-13

## 2024-05-13 PROBLEM — E66.812 CLASS 2 SEVERE OBESITY DUE TO EXCESS CALORIES WITH SERIOUS COMORBIDITY AND BODY MASS INDEX (BMI) OF 39.0 TO 39.9 IN ADULT: Status: ACTIVE | Noted: 2023-05-11

## 2024-05-13 PROCEDURE — 99497 ADVNCD CARE PLAN 30 MIN: CPT | Performed by: INTERNAL MEDICINE

## 2024-05-13 PROCEDURE — 3074F SYST BP LT 130 MM HG: CPT | Performed by: INTERNAL MEDICINE

## 2024-05-13 PROCEDURE — 99214 OFFICE O/P EST MOD 30 MIN: CPT | Performed by: INTERNAL MEDICINE

## 2024-05-13 PROCEDURE — 1160F RVW MEDS BY RX/DR IN RCRD: CPT | Performed by: INTERNAL MEDICINE

## 2024-05-13 PROCEDURE — 3078F DIAST BP <80 MM HG: CPT | Performed by: INTERNAL MEDICINE

## 2024-05-13 PROCEDURE — 1159F MED LIST DOCD IN RCRD: CPT | Performed by: INTERNAL MEDICINE

## 2024-05-13 PROCEDURE — 3061F NEG MICROALBUMINURIA REV: CPT | Performed by: INTERNAL MEDICINE

## 2024-05-13 PROCEDURE — 3008F BODY MASS INDEX DOCD: CPT | Performed by: INTERNAL MEDICINE

## 2024-05-13 PROCEDURE — 3044F HG A1C LEVEL LT 7.0%: CPT | Performed by: INTERNAL MEDICINE

## 2024-05-13 PROCEDURE — 99397 PER PM REEVAL EST PAT 65+ YR: CPT | Performed by: INTERNAL MEDICINE

## 2024-05-13 PROCEDURE — G0446 INTENS BEHAVE THER CARDIO DX: HCPCS | Performed by: INTERNAL MEDICINE

## 2024-05-13 PROCEDURE — G0447 BEHAVIOR COUNSEL OBESITY 15M: HCPCS | Performed by: INTERNAL MEDICINE

## 2024-05-13 PROCEDURE — 1170F FXNL STATUS ASSESSED: CPT | Performed by: INTERNAL MEDICINE

## 2024-05-13 PROCEDURE — G0439 PPPS, SUBSEQ VISIT: HCPCS | Performed by: INTERNAL MEDICINE

## 2024-05-13 PROCEDURE — 4010F ACE/ARB THERAPY RXD/TAKEN: CPT | Performed by: INTERNAL MEDICINE

## 2024-05-13 PROCEDURE — G0444 DEPRESSION SCREEN ANNUAL: HCPCS | Performed by: INTERNAL MEDICINE

## 2024-05-13 PROCEDURE — 1036F TOBACCO NON-USER: CPT | Performed by: INTERNAL MEDICINE

## 2024-05-13 PROCEDURE — 3048F LDL-C <100 MG/DL: CPT | Performed by: INTERNAL MEDICINE

## 2024-05-13 ASSESSMENT — ENCOUNTER SYMPTOMS
DEPRESSION: 0
LOSS OF SENSATION IN FEET: 0
OCCASIONAL FEELINGS OF UNSTEADINESS: 0

## 2024-05-13 ASSESSMENT — PATIENT HEALTH QUESTIONNAIRE - PHQ9
SUM OF ALL RESPONSES TO PHQ9 QUESTIONS 1 AND 2: 3
5. POOR APPETITE OR OVEREATING: NOT AT ALL
8. MOVING OR SPEAKING SO SLOWLY THAT OTHER PEOPLE COULD HAVE NOTICED. OR THE OPPOSITE, BEING SO FIGETY OR RESTLESS THAT YOU HAVE BEEN MOVING AROUND A LOT MORE THAN USUAL: NOT AT ALL
1. LITTLE INTEREST OR PLEASURE IN DOING THINGS: NOT AT ALL
2. FEELING DOWN, DEPRESSED OR HOPELESS: NEARLY EVERY DAY
10. IF YOU CHECKED OFF ANY PROBLEMS, HOW DIFFICULT HAVE THESE PROBLEMS MADE IT FOR YOU TO DO YOUR WORK, TAKE CARE OF THINGS AT HOME, OR GET ALONG WITH OTHER PEOPLE: SOMEWHAT DIFFICULT
4. FEELING TIRED OR HAVING LITTLE ENERGY: NEARLY EVERY DAY
6. FEELING BAD ABOUT YOURSELF - OR THAT YOU ARE A FAILURE OR HAVE LET YOURSELF OR YOUR FAMILY DOWN: SEVERAL DAYS
7. TROUBLE CONCENTRATING ON THINGS, SUCH AS READING THE NEWSPAPER OR WATCHING TELEVISION: NOT AT ALL
SUM OF ALL RESPONSES TO PHQ QUESTIONS 1-9: 8
3. TROUBLE FALLING OR STAYING ASLEEP OR SLEEPING TOO MUCH: SEVERAL DAYS
9. THOUGHTS THAT YOU WOULD BE BETTER OFF DEAD, OR OF HURTING YOURSELF: NOT AT ALL

## 2024-05-13 ASSESSMENT — ANXIETY QUESTIONNAIRES
3. WORRYING TOO MUCH ABOUT DIFFERENT THINGS: SEVERAL DAYS
5. BEING SO RESTLESS THAT IT IS HARD TO SIT STILL: SEVERAL DAYS
IF YOU CHECKED OFF ANY PROBLEMS ON THIS QUESTIONNAIRE, HOW DIFFICULT HAVE THESE PROBLEMS MADE IT FOR YOU TO DO YOUR WORK, TAKE CARE OF THINGS AT HOME, OR GET ALONG WITH OTHER PEOPLE: NOT DIFFICULT AT ALL
2. NOT BEING ABLE TO STOP OR CONTROL WORRYING: SEVERAL DAYS
4. TROUBLE RELAXING: SEVERAL DAYS
1. FEELING NERVOUS, ANXIOUS, OR ON EDGE: NEARLY EVERY DAY
7. FEELING AFRAID AS IF SOMETHING AWFUL MIGHT HAPPEN: SEVERAL DAYS
GAD7 TOTAL SCORE: 11
6. BECOMING EASILY ANNOYED OR IRRITABLE: NEARLY EVERY DAY

## 2024-05-13 ASSESSMENT — ACTIVITIES OF DAILY LIVING (ADL)
MANAGING_FINANCES: INDEPENDENT
GROCERY_SHOPPING: INDEPENDENT
BATHING: INDEPENDENT
DOING_HOUSEWORK: INDEPENDENT
DRESSING: INDEPENDENT
TAKING_MEDICATION: INDEPENDENT

## 2024-05-13 NOTE — ASSESSMENT & PLAN NOTE
Hemoglobin A1c stable at 6.9 no microalbuminuria continues on diet control consider SGLT2 therapy in the setting of chronic diastolic congestive heart failure monitor microalbuminuria reevaluate in 6 months

## 2024-05-13 NOTE — PATIENT INSTRUCTIONS

## 2024-05-13 NOTE — PROGRESS NOTES
Depression and anxiety screening completed   PHQ9 score   GAD7 score   I spent 15 minutes obtaining and discussing depression screening using PHQ 2 questions with results documented in chart.  Screening using PHQ-9 and MADDI-7 scores were used for follow-up with treatment and referral plan discussed.      I spent greater than 15 minutes face-to-face with individual providing recommendations for nutrition choices and exercise plan to help achieve weight reductionI spent 15 minutes face-to-face with this individual discussing the cardiovascular risk and behavioral therapies of nutritional choices exercise and elimination of habits contributing to risk .We agreed on a plan how they may be able to reduce  current cardiovascular risk.  Per patient with calculation greater than 10% aspirin use was discussed and encouraged unless known allergy or increased risk of bleeding contraindicates use patient's 10-year CV risk estimate calculates:I spent greater than 15 minutes discussing advance care planning including the explanation and discussion of advanced directives.  If patient does not have current up-to-date documents examples and information provided on how to create both living will and power of . [facility] toolkit was given to patient and was encouraged to work out completing these documents.Subjective   Reason for Visit: Purnima Puente is an 73 y.o. female here for a Medicare Wellness visit.     Past Medical, Surgical, and Family History reviewed and updated in chart.    Reviewed all medications by prescribing practitioner or clinical pharmacist (such as prescriptions, OTCs, herbal therapies and supplements) and documented in the medical record.    HPI    Patient Care Team:  Chevy Onofre DO as PCP - General  Chevy Onofre DO as PCP - [employer] Advantage PCP  Chevy Onofre DO as PCP - [employer] Advantage PCP     Review of Systems   All other systems reviewed and are  "negative.      Objective   Vitals:  /60 (BP Location: Left arm, Patient Position: Sitting)   Pulse 64   Ht 1.499 m (4' 11\")   Wt 88.9 kg (196 lb)   BMI 39.59 kg/m²       Physical Exam  Vitals and nursing note reviewed.   Constitutional:       General: She is not in acute distress.     Appearance: Normal appearance. She is well-developed. She is obese. She is not toxic-appearing.   HENT:      Head: Normocephalic and atraumatic.      Right Ear: Tympanic membrane and external ear normal.      Left Ear: Tympanic membrane and external ear normal.      Nose: Nose normal.      Mouth/Throat:      Mouth: Mucous membranes are moist.      Pharynx: Oropharynx is clear. No oropharyngeal exudate or posterior oropharyngeal erythema.      Tonsils: No tonsillar exudate. 2+ on the right. 2+ on the left.   Eyes:      Extraocular Movements: Extraocular movements intact.      Conjunctiva/sclera: Conjunctivae normal.   Cardiovascular:      Rate and Rhythm: Normal rate and regular rhythm.      Pulses: Normal pulses.      Heart sounds: Normal heart sounds. No murmur heard.  Pulmonary:      Effort: Pulmonary effort is normal.      Breath sounds: Normal breath sounds.   Abdominal:      General: Abdomen is flat. Bowel sounds are normal.      Palpations: Abdomen is soft.   Musculoskeletal:      Cervical back: Neck supple.   Lymphadenopathy:      Cervical: No cervical adenopathy.   Skin:     General: Skin is warm and dry.      Findings: No rash.   Neurological:      Mental Status: She is alert. Mental status is at baseline.   Psychiatric:         Mood and Affect: Mood normal.         Behavior: Behavior normal.         Thought Content: Thought content normal.         Judgment: Judgment normal.         Assessment/Plan   Problem List Items Addressed This Visit       Asthmatic bronchitis (HHS-HCC)    Current Assessment & Plan     Symptoms well-controlled on Symbicort 160/4.52 puffs twice a day         Cryptogenic cirrhosis (Multi)    " Current Assessment & Plan     Well compensated and asymptomatic continue to monitor         Dyslipidemia associated with type 2 diabetes mellitus (Multi)    Current Assessment & Plan     LDL cholesterol 55 with HDL 33 triglyceride level 117 optimal continue pravastatin 40 mg daily         Relevant Orders    Follow Up In [facility] - PCP - Established    Schwannoma of nerve of chest    Current Assessment & Plan     No evidence of recurrence on CT chest completed April 2024 continue annual follow-up         Major depression in remission (CMS-HCC)    Current Assessment & Plan     Stable continue citalopram 10 mg daily duloxetine 60 mg daily and sertraline 100 mg daily         Type 2 diabetes mellitus with hyperglycemia, without long-term current use of insulin (Multi)    Current Assessment & Plan     Hemoglobin A1c stable at 6.9 no microalbuminuria continues on diet control consider SGLT2 therapy in the setting of chronic diastolic congestive heart failure monitor microalbuminuria reevaluate in 6 months         Relevant Orders    Comprehensive Metabolic Panel    Hemoglobin A1C    Lipid Panel    Albumin , Urine Random    Follow Up In [facility] - PCP - Established    Visit for screening mammogram    Current Assessment & Plan     Annual screening mammogram scheduled for August 14         Relevant Orders    BI mammo bilateral screening tomosynthesis    Colon cancer screening    Current Assessment & Plan     Repeat Cologuard testing for colon cancer screening         Relevant Orders    Cologuard® colon cancer screening    Class 2 severe obesity due to excess calories with serious comorbidity and body mass index (BMI) of 39.0 to 39.9 in adult (Multi)    Current Assessment & Plan     Improvement in BMI to less than 40 continue with diet and exercise changes patient has lost 6 pounds in last 6 months continue encouragement         Thoracic ascending aortic aneurysm (CMS-HCC)    Current Assessment & Plan      Follow with cardiology with annual echocardiogram stable at this time ascending aorta 4.0 cm has echocardiogram scheduled from March         Hypertensive heart disease with chronic diastolic congestive heart failure (Multi)    Current Assessment & Plan     Well compensated follows with cardiologist continue carvedilol 25 mg twice a day irbesartan 150 mg 1 tablet daily and amlodipine 2.5 mg daily         Relevant Orders    Follow Up In [facility] - PCP - Established    Medicare annual wellness visit, subsequent - Primary    Relevant Orders    Follow Up In [facility] - PCP - Established        Patient was identified as a fall risk. Risk prevention instructions provided.

## 2024-05-13 NOTE — ASSESSMENT & PLAN NOTE
Follow with cardiology with annual echocardiogram stable at this time ascending aorta 4.0 cm has echocardiogram scheduled from March

## 2024-05-13 NOTE — ASSESSMENT & PLAN NOTE
Well compensated follows with cardiologist continue carvedilol 25 mg twice a day irbesartan 150 mg 1 tablet daily and amlodipine 2.5 mg daily

## 2024-05-13 NOTE — ASSESSMENT & PLAN NOTE
Improvement in BMI to less than 40 continue with diet and exercise changes patient has lost 6 pounds in last 6 months continue encouragement

## 2024-05-13 NOTE — PROGRESS NOTES
"Subjective   Reason for Visit: Purnima Puente is an 73 y.o. female here for a Medicare Wellness visit.               HPI    Patient Care Team:  Chevy Onofre DO as PCP - General  Chevy Onofre,  as PCP - [employer]vantage PCP  Chevy Onofre DO as PCP - [employer]vantage PCP     Review of Systems    Objective   Vitals:  /60 (BP Location: Left arm, Patient Position: Sitting)   Pulse 64   Ht 1.499 m (4' 11\")   Wt 88.9 kg (196 lb)   BMI 39.59 kg/m²       Physical Exam    Assessment/Plan   Problem List Items Addressed This Visit       Asthmatic bronchitis (HHS-HCC)    Benign essential hypertension    Cryptogenic cirrhosis (Multi)    Dyslipidemia, goal LDL below 100    GERD (gastroesophageal reflux disease)    Severe episode of recurrent major depressive disorder, 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

## 2024-07-15 ENCOUNTER — TELEPHONE (OUTPATIENT)
Dept: PRIMARY CARE | Facility: CLINIC | Age: 74
End: 2024-07-15
Payer: COMMERCIAL

## 2024-07-15 DIAGNOSIS — I10 BENIGN ESSENTIAL HYPERTENSION: ICD-10-CM

## 2024-07-15 DIAGNOSIS — E78.5 DYSLIPIDEMIA, GOAL LDL BELOW 100: ICD-10-CM

## 2024-07-15 DIAGNOSIS — K21.9 GASTROESOPHAGEAL REFLUX DISEASE WITHOUT ESOPHAGITIS: ICD-10-CM

## 2024-07-15 DIAGNOSIS — F33.2 SEVERE EPISODE OF RECURRENT MAJOR DEPRESSIVE DISORDER, WITHOUT PSYCHOTIC FEATURES (MULTI): ICD-10-CM

## 2024-07-15 RX ORDER — AMLODIPINE BESYLATE 2.5 MG/1
2.5 TABLET ORAL DAILY
Qty: 90 TABLET | Refills: 3 | Status: SHIPPED | OUTPATIENT
Start: 2024-07-15

## 2024-07-15 RX ORDER — CARVEDILOL 25 MG/1
25 TABLET ORAL
Qty: 180 TABLET | Refills: 3 | Status: SHIPPED | OUTPATIENT
Start: 2024-07-15

## 2024-07-15 RX ORDER — IRBESARTAN 150 MG/1
150 TABLET ORAL DAILY
Qty: 90 TABLET | Refills: 3 | Status: SHIPPED | OUTPATIENT
Start: 2024-07-15

## 2024-07-15 RX ORDER — DULOXETIN HYDROCHLORIDE 60 MG/1
60 CAPSULE, DELAYED RELEASE ORAL DAILY
Qty: 90 CAPSULE | Refills: 3 | Status: SHIPPED | OUTPATIENT
Start: 2024-07-15

## 2024-07-15 RX ORDER — PRAVASTATIN SODIUM 40 MG/1
40 TABLET ORAL DAILY
Qty: 90 TABLET | Refills: 3 | Status: SHIPPED | OUTPATIENT
Start: 2024-07-15

## 2024-07-15 RX ORDER — PANTOPRAZOLE SODIUM 40 MG/1
40 TABLET, DELAYED RELEASE ORAL 2 TIMES DAILY
Qty: 180 TABLET | Refills: 3 | Status: SHIPPED | OUTPATIENT
Start: 2024-07-15

## 2024-07-15 RX ORDER — SERTRALINE HYDROCHLORIDE 100 MG/1
100 TABLET, FILM COATED ORAL DAILY
Qty: 90 TABLET | Refills: 3 | Status: SHIPPED | OUTPATIENT
Start: 2024-07-15

## 2024-07-15 NOTE — TELEPHONE ENCOUNTER
Refill request:    Pravastatin 40 mg every day   Amlodipine 2.5 mg   Pantoprazole 40 mg   Coreg 25 mg   Duloxetine 60mg  Zoloft 100 mg  Irbesartan 150 mg    Pharmacy: [facility]

## 2024-08-16 ENCOUNTER — HOSPITAL ENCOUNTER (OUTPATIENT)
Dept: RADIOLOGY | Facility: HOSPITAL | Age: 74
Discharge: HOME | End: 2024-08-16
Payer: COMMERCIAL

## 2024-08-16 VITALS — BODY MASS INDEX: 37.7 KG/M2 | HEIGHT: 59 IN | WEIGHT: 187 LBS

## 2024-08-16 DIAGNOSIS — Z12.31 VISIT FOR SCREENING MAMMOGRAM: ICD-10-CM

## 2024-08-16 PROCEDURE — 77067 SCR MAMMO BI INCL CAD: CPT

## 2024-08-16 PROCEDURE — 77063 BREAST TOMOSYNTHESIS BI: CPT | Performed by: RADIOLOGY

## 2024-08-16 PROCEDURE — 77067 SCR MAMMO BI INCL CAD: CPT | Performed by: RADIOLOGY

## 2024-09-23 ENCOUNTER — HOSPITAL ENCOUNTER (OUTPATIENT)
Dept: CARDIOLOGY | Facility: HOSPITAL | Age: 74
Discharge: HOME | End: 2024-09-23
Payer: COMMERCIAL

## 2024-09-23 DIAGNOSIS — I25.10 ATHEROSCLEROSIS OF NATIVE CORONARY ARTERY OF NATIVE HEART WITHOUT ANGINA PECTORIS: ICD-10-CM

## 2024-09-23 DIAGNOSIS — E66.01 CLASS 3 SEVERE OBESITY DUE TO EXCESS CALORIES WITH SERIOUS COMORBIDITY AND BODY MASS INDEX (BMI) OF 40.0 TO 44.9 IN ADULT: ICD-10-CM

## 2024-09-23 DIAGNOSIS — I71.21 ANEURYSM OF ASCENDING AORTA WITHOUT RUPTURE (CMS-HCC): ICD-10-CM

## 2024-09-23 DIAGNOSIS — R60.0 LOWER EXTREMITY EDEMA: ICD-10-CM

## 2024-09-23 DIAGNOSIS — R60.0 BILATERAL LOWER EXTREMITY EDEMA: ICD-10-CM

## 2024-09-23 DIAGNOSIS — E78.5 DYSLIPIDEMIA, GOAL LDL BELOW 100: ICD-10-CM

## 2024-09-23 DIAGNOSIS — I10 BENIGN ESSENTIAL HYPERTENSION: ICD-10-CM

## 2024-09-23 PROCEDURE — 93306 TTE W/DOPPLER COMPLETE: CPT | Performed by: INTERNAL MEDICINE

## 2024-09-23 PROCEDURE — 93306 TTE W/DOPPLER COMPLETE: CPT

## 2024-09-24 LAB
AORTIC VALVE MEAN GRADIENT: 4.6 MMHG
AORTIC VALVE PEAK VELOCITY: 1.54 M/S
AV PEAK GRADIENT: 9.5 MMHG
EJECTION FRACTION: 55 %
GLOBAL LONGITUDINAL STRAIN: 17 %
LEFT ATRIUM VOLUME AREA LENGTH INDEX BSA: 24.5 ML/M2
LEFT VENTRICLE INTERNAL DIMENSION DIASTOLE: 5.17 CM (ref 3.5–6)
LEFT VENTRICULAR OUTFLOW TRACT DIAMETER: 2 CM
MITRAL VALVE E/A RATIO: 1
MITRAL VALVE E/E' RATIO: 13.5
RIGHT VENTRICLE FREE WALL PEAK S': 12.96 CM/S
TRICUSPID ANNULAR PLANE SYSTOLIC EXCURSION: 2.9 CM

## 2024-09-25 ENCOUNTER — TELEPHONE (OUTPATIENT)
Dept: CARDIOLOGY | Facility: HOSPITAL | Age: 74
End: 2024-09-25
Payer: COMMERCIAL

## 2024-09-25 NOTE — TELEPHONE ENCOUNTER
9/25/24  1216  Called patient; no answer. Left brief voice message informing of echocardiogram results and to call if questions.    ----- Message from Jesu Butts sent at 9/24/2024  2:03 PM EDT -----  Echocardiogram shows low normal left ventricular systolic function with an ejection fraction of 55%, normal right ventricular systolic function, and no significant valve abnormalities.

## 2024-10-08 PROBLEM — E66.01 SEVERE OBESITY (MULTI): Status: ACTIVE | Noted: 2024-10-08

## 2024-10-08 PROBLEM — E78.5 DYSLIPIDEMIA: Status: ACTIVE | Noted: 2024-10-08

## 2024-10-08 NOTE — PROGRESS NOTES
[facility]    Patient Name: Purnima Puente  Patient : 1950      Scribe Attestation  By signing my name below, INina Scribe   attest that this documentation has been prepared under the direction and in the presence of Jesu Butts DO.      Reason for visit:  This is a 73-year-old female here for follow-up regarding moderate coronary artery disease as seen on cardiac catheterization done in May 2015, thoracic ascending aortic aneurysm measured at 4.0 cm, hypertension, dyslipidemia, and severe obesity.    HPI:  This is a 73-year-old female here for follow-up regarding moderate coronary artery disease as seen on cardiac catheterization done in May 2015, thoracic ascending aortic aneurysm measured at 4.0 cm, hypertension, dyslipidemia, and severe obesity.  The patient was last evaluated by me in 2024.  At that visit I ordered an echocardiogram to be completed in 6 months, and asked the patient to follow-up in 1 year.  CMP done in May 2024 showed normal serum sodium and potassium with a serum creatinine 0.65, normal ALT/AST, hemoglobin A1c was 6.9%.  Lipid panel done in May 2024 showed an LDL cholesterol 55 and triglycerides of 117 while on pravastatin 40 mg daily. Echocardiogram done in 2024 showed low normal left ventricular systolic function with an ejection fraction of 55%, normal right ventricular systolic function, and no significant valve abnormalities. ECG done today showed sinus rhythm with a heart rate of 74 bpm.  The patient reports that she has been feeling generally well from the cardiac standpoint. She denies any new chest pain, shortness of breath, palpitations and lightheadedness. She states that she takes all of her medications as prescribed. During my exam, she was resting comfortably on the exam table.            Assessment/Plan:   1. Coronary artery disease  The patient has a history of moderate coronary artery  disease as seen on cardiac catheterization done in May 2015.  ECG done today showed sinus rhythm with a heart rate of 74 bpm.   She denies anginal chest dicomfort.  Blood pressure appears controlled on exam today.   She should continue current antihypertensive medications and antiplatelet therapy.  Echocardiogram done in September 2024 showed low normal left ventricular systolic function with an ejection fraction of 55%, normal right ventricular systolic function, and no significant valve abnormalities.  Recent lab works as noted in the HPI.  Lipid panel done in May 2024 showed an LDL cholesterol 55 and triglycerides of 117 while on pravastatin 40 mg daily.   Lab works as noted below will be done in 6 months prior to his next visit.   Please see lifestyle recommendations below.  Follow up in 1 year and sooner if necessary.      2.  Thoracic ascending aortic aneurysm  The patient was noted to have thoracic ascending aortic aneurysm seen on previous echocardiogram done in August 2023 which showed mild dilatation of the ascending aorta measured at 4.0 cm.  Continue risk factor modification.    3. Hypertension  The patient has a history of hypertension and blood pressure appears controlled on exam today.   She should continue her current antihypertensive medications and monitor her blood pressure at home.      4. Dyslipidemia  Lipid panel done in May 2024 showed an LDL cholesterol 55 and triglycerides of 117 while on pravastatin 40 mg daily.   Lipid panel will be updated in 6 months.  Please see lifestyle recommendations below.      5. Severe obesity  Please see lifestyle recommendations below.        Orders:   CMP/lipid/magnesium/CBC in 6 months,   Follow-up in 1 year.    Lifestyle Recommendations  I recommend a whole-food plant-based diet, an eating pattern that encourages the consumption of unrefined plant foods (such as fruits, vegetables, tubers, whole grains, legumes, nuts and seeds) and discourages meats, dairy  products, eggs and processed foods.     The AHA/ACC recommends that the patient consume a dietary pattern that emphasizes intake of vegetables, fruits, and whole grains; includes low-fat dairy products, poultry, fish, legumes, non-tropical vegetable oils, and nuts; and limits intake of sodium, sweets, sugar-sweetened beverages, and red meats.  Adapt this dietary pattern to appropriate calorie requirements (a 500-750 kcal/day deficit to loose weight), personal and cultural food preferences, and nutrition therapy for other medical conditions (including diabetes).  Achieve this pattern by following plans such as the [place] Mediterranean, DASH dietary pattern, or AHA diet.     Engage in 2 hours and 30 minutes per week of moderate-intensity physical activity, or 1 hour and 15 minutes (75 minutes) per week of vigorous-intensity aerobic physical activity, or an equivalent combination of moderate and vigorous-intensity aerobic physical activity. Aerobic activity should be performed in episodes of at least 10 minutes preferably spread throughout the week.     Adhering to a heart healthy diet, regular exercise habits, avoidance of tobacco products, and maintenance of a healthy weight are crucial components of their heart disease risk reduction.     Any positive review of systems not specifically addressed in the office visit today should be evaluated and treated by the patients primary care physician or in an emergency department if necessary     Patient was notified that results from ordered tests will be called to the patient if it changes current management; it will otherwise be discussed at a future appointment and available on  [employer].     Thank you for allowing me to participate in the care of this patient.        This document was generated using the assistance of voice recognition software. If there are any errors of spelling, grammar, syntax, or meaning; please feel free to contact me directly for  clarification.    Past Medical History:  She has a past medical history of Allergic contact dermatitis due to other chemical products (03/08/2021), Asthma ([facility]), Calculus of bile duct with chronic cholecystitis without obstruction (02/11/2021), Calculus of bile duct without cholangitis or cholecystitis without obstruction (02/04/2021), Chronic obstructive pulmonary disease with (acute) exacerbation (Multi) (07/09/2020), Diverticulitis of intestine, part unspecified, without perforation or abscess without bleeding (04/02/2020), Encounter for general adult medical examination without abnormal findings (11/14/2019), Encounter for general adult medical examination without abnormal findings (04/06/2022), Encounter for other preprocedural examination (09/18/2020), Left ventricular failure, unspecified (Multi) (10/16/2019), Lesion of radial nerve, right upper limb (07/29/2022), Localized swelling, mass and lump, right upper limb (10/27/2020), Morbid (severe) obesity due to excess calories (Multi) (06/09/2021), Other symptoms and signs involving the genitourinary system (09/18/2020), Other symptoms and signs involving the musculoskeletal system (09/02/2021), Pain in right elbow (07/24/2020), Pain in right shoulder (01/25/2021), Personal history of neoplasm of uncertain behavior (07/24/2020), Personal history of other diseases of the circulatory system, Personal history of other diseases of the circulatory system, Personal history of other diseases of the circulatory system, Personal history of other diseases of the circulatory system, Personal history of other diseases of the nervous system and sense organs, Personal history of other diseases of the respiratory system, Personal history of other endocrine, nutritional and metabolic disease (09/15/2020), Personal history of other specified conditions (02/04/2021), Personal history of other specified conditions (04/02/2020), Personal history of other specified conditions  (01/08/2021), Personal history of other specified conditions (10/31/2017), Personal history of urinary (tract) infections (09/08/2020), Portal vein thrombosis (02/11/2021), Portal vein thrombosis (02/04/2021), Primary pulmonary hypertension (Multi) (02/04/2021), Rash and other nonspecific skin eruption (01/08/2021), Right upper quadrant abdominal tenderness (01/08/2021), Spondylosis without myelopathy or radiculopathy, cervical region, Stiffness of right shoulder, not elsewhere classified (02/02/2021), Strain of unspecified achilles tendon, initial encounter, Syncope and collapse, and Urinary tract infection, site not specified (09/11/2020).    Past Surgical History:  She has a past surgical history that includes Hysterectomy (02/23/2017); Bladder surgery (02/23/2017); Other surgical history (09/29/2021); Other surgical history (07/24/2020); Other surgical history (10/28/2020); Other surgical history (06/09/2021); Other surgical history (08/06/2020); Other surgical history (11/14/2019); and Other surgical history (11/14/2019).      Social History:  She reports that she has never smoked. She has never used smokeless tobacco. She reports that she does not currently use alcohol. She reports that she does not use drugs.    Family History:  Family History   Problem Relation Name Age of Onset    Breast cancer Mother      Coronary artery disease Other      Breast cancer Other      Heart attack Other      Psoriasis Other      Other (CABG) Other      Colonic polyp Other          Allergies:  Diphenhydramine hcl, Ciprofloxacin, Phenytoin sodium extended, Sulfa (sulfonamide antibiotics), Amitriptyline, Bupropion, Chlordiazepoxide, Doxycycline, Enoxaparin, Erythromycin, Heparin, Hydromorphone, Hydroxyzine pamoate, Latex, Nitrofurantoin macrocrystal, Penicillins, Statins-hmg-coa reductase inhibitors, Sumatriptan, and Terbinafine    Outpatient Medications:  Current Outpatient Medications   Medication Instructions    amLODIPine  "(NORVASC) 2.5 mg, oral, Daily    aspirin 81 mg EC tablet 1 tablet, oral, Daily    budesonide-formoteroL (Symbicort) 160-4.5 mcg/actuation inhaler inhalation, Every 12 hours    carvedilol (COREG) 25 mg, oral, 2 times daily (morning and late afternoon)    citalopram (CeleXA) 10 mg tablet Every 24 hours    DULoxetine (CYMBALTA) 60 mg, oral, Daily    irbesartan (AVAPRO) 150 mg, oral, Daily    lidocaine 4 % patch 1 patch, transdermal, Daily    meloxicam (Mobic) 15 mg tablet oral    pantoprazole (PROTONIX) 40 mg, oral, 2 times daily    pravastatin (PRAVACHOL) 40 mg, oral, Daily    sertraline (ZOLOFT) 100 mg, oral, Daily        ROS:  A 14 point review of systems was done and is negative other than as stated in HPI    Vitals:      5/11/2023    12:16 PM 8/3/2023    10:11 AM 9/15/2023    11:53 AM 11/13/2023    10:35 AM 3/22/2024    12:38 PM 5/13/2024     9:53 AM 8/16/2024    11:17 AM   Vitals   Systolic 110 108 124 122 124 120    Diastolic 70 70 66 80 72 60    Heart Rate 64 82 83 68 87 64    Height (in) 1.499 m (4' 11\") 1.499 m (4' 11\") 1.499 m (4' 11\") 1.499 m (4' 11\") 1.499 m (4' 11\") 1.499 m (4' 11\") 1.499 m (4' 11\")   Weight (lb) 204 199.13 200.38 202 197.2 196 187   BMI 41.2 kg/m2 40.22 kg/m2 40.47 kg/m2 40.8 kg/m2 39.83 kg/m2 39.59 kg/m2 37.77 kg/m2   BSA (m2) 1.96 m2 1.94 m2 1.95 m2 1.95 m2 1.93 m2 1.92 m2 1.88 m2        Physical Exam:   Constitutional: Cooperative, in no acute distress, alert, appears stated age.   Skin: Skin color, texture, turgor normal. No rashes or lesions.   Head: Normocephalic. No masses, lesions, tenderness or abnormalities   Eyes: Extraocular movements are grossly intact.   Mouth and throat: Mucous membranes moist   Neck: Neck supple, no carotid bruits, no JVD   Respiratory: Lungs clear to auscultation, no wheezing or rhonchi, no use of accessory muscles   Chest wall: No scars, normal excursion with respiration   Cardiovascular: Regular rhythm without murmur, gallop, or rubs "   Gastrointestinal: Abdomen soft, nontender. Bowel sounds normal. Severely obese.  Musculoskeletal: Strength equal in upper extremities   Extremities: Trace pitting edema   Neurologic: Sensation grossly intact, alert and oriented x3      Intake/Output:   No intake/output data recorded.    Outpatient Medications  Current Outpatient Medications on File Prior to Visit   Medication Sig Dispense Refill    amLODIPine (Norvasc) 2.5 mg tablet Take 1 tablet (2.5 mg) by mouth once daily. 90 tablet 3    aspirin 81 mg EC tablet Take 1 tablet (81 mg) by mouth once daily.      budesonide-formoteroL (Symbicort) 160-4.5 mcg/actuation inhaler Inhale every 12 hours.      carvedilol (Coreg) 25 mg tablet Take 1 tablet (25 mg) by mouth 2 times daily (morning and late afternoon). 180 tablet 3    citalopram (CeleXA) 10 mg tablet once every 24 hours.      DULoxetine (Cymbalta) 60 mg DR capsule Take 1 capsule (60 mg) by mouth once daily. 90 capsule 3    irbesartan (Avapro) 150 mg tablet Take 1 tablet (150 mg) by mouth once daily. 90 tablet 3    lidocaine 4 % patch Place 1 patch on the skin once daily.      meloxicam (Mobic) 15 mg tablet Take by mouth.      pantoprazole (ProtoNix) 40 mg EC tablet Take 1 tablet (40 mg) by mouth 2 times a day. 180 tablet 3    pravastatin (Pravachol) 40 mg tablet Take 1 tablet (40 mg) by mouth once daily. 90 tablet 3    sertraline (Zoloft) 100 mg tablet Take 1 tablet (100 mg) by mouth once daily. 90 tablet 3     No current facility-administered medications on file prior to visit.       Labs: (past 26 weeks)  Recent Results (from the past 4368 hour(s))   Comprehensive Metabolic Panel    Collection Time: 05/06/24  9:45 AM   Result Value Ref Range    Glucose 157 (H) 74 - 99 mg/dL    Sodium 141 136 - 145 mmol/L    Potassium 3.9 3.5 - 5.3 mmol/L    Chloride 107 98 - 107 mmol/L    Bicarbonate 27 21 - 32 mmol/L    Anion Gap 11 10 - 20 mmol/L    Urea Nitrogen 13 6 - 23 mg/dL    Creatinine 0.65 0.50 - 1.05 mg/dL    eGFR  >90 >60 mL/min/1.73m*2    Calcium 8.3 (L) 8.6 - 10.3 mg/dL    Albumin 3.5 3.4 - 5.0 g/dL    Alkaline Phosphatase 81 33 - 136 U/L    Total Protein 6.9 6.4 - 8.2 g/dL    AST 29 9 - 39 U/L    Bilirubin, Total 0.5 0.0 - 1.2 mg/dL    ALT 17 7 - 45 U/L   Lipid Panel    Collection Time: 05/06/24  9:45 AM   Result Value Ref Range    Cholesterol 112 0 - 199 mg/dL    HDL-Cholesterol 33.9 mg/dL    Cholesterol/HDL Ratio 3.3     LDL Calculated 55 <=99 mg/dL    VLDL 23 0 - 40 mg/dL    Triglycerides 117 0 - 149 mg/dL    Non HDL Cholesterol 78 0 - 149 mg/dL   Hemoglobin A1C    Collection Time: 05/06/24  9:45 AM   Result Value Ref Range    Hemoglobin A1C 6.9 (H) see below %    Estimated Average Glucose 151 Not Established mg/dL   Albumin , Urine Random    Collection Time: 05/06/24  9:45 AM   Result Value Ref Range    Albumin, Urine Random 12.9 Not established mg/L    Creatinine, Urine Random 128.5 20.0 - 320.0 mg/dL    Albumin/Creatinine Ratio 10.0 <30.0 ug/mg Creat   Transthoracic Echo (TTE) Complete    Collection Time: 09/23/24 11:16 AM   Result Value Ref Range    LVOT diam 2.00 cm    MV E/A ratio 1.00     MV avg E/e' ratio 13.50     Tricuspid annular plane systolic excursion 2.9 cm    AV mn grad 4.6 mmHg    LA vol index A/L 24.5 ml/m2    AV pk lety 1.54 m/s    LV EF 55 %    RV free wall pk S' 12.96 cm/s    LV GLS 17.0 %    LVIDd 5.17 cm    AV pk grad 9.5 mmHg       ECG  No results found for this or any previous visit (from the past 4464 hour(s)).    Echocardiogram  No results found for this or any previous visit from the past 1095 days.      CV Studies:  EKG: No results found for this or any previous visit (from the past 4464 hour(s)).  Echocardiogram:   Echocardiogram     [place] [id number]  Phone [phone] Fax [phone]    TRANSTHORACIC ECHOCARDIOGRAM REPORT      Patient Name:     CHARLIE Vo Physician:  [id number] Homer Pringle  Date:       8/29/2023            Referring           SARA CALDWELL  Physician:  MRN/PID:          [id number]             PCP:  Accession/Order#: [id number]         Department          [facility]  Location:  YOB: 1950           Fellow:  Gender:           F                    Nurse:              Marya Collado RN  Admit Date:                            Sonographer:        Gilda Hernandez RD  Admission Status: Outpatient           Additional Staff:  Height:           149.86 cm            CC Report to:  Weight:           90.26 kg             Study Type:         Echocardiogram  BSA:              1.84 m2  Blood Pressure: 108 /70 mmHg    Diagnosis/ICD: I25.10-Atherosclerotic heart disease of native coronary artery  without angina pectoris; I10-Essential (primary) hypertension  Indication:    CAD,HTN  Procedure/CPT: Echo Complete w Full [id number]    Patient History:  Pertinent History: CAD.    Study Detail: The following Echo studies were performed: 2D, M-Mode, Doppler and  color flow. Technically challenging study due to body habitus and  prominent lung artifact. Optison used as a contrast agent for  endocardial border definition.      PHYSICIAN INTERPRETATION:  Left Ventricle: Left ventricular systolic function is normal, with an estimated ejection fraction of 50-55%. There are no regional wall motion abnormalities. The left ventricular cavity size is normal. Spectral Doppler shows a normal pattern of left ventricular diastolic filling.  Left Atrium: The left atrium is normal in size.  Right Ventricle: The right ventricle is normal in size. There is normal right ventricular global systolic function.  Right Atrium: The right atrium is normal in size.  Aortic Valve: The aortic valve appears structurally normal. There is no evidence of aortic valve regurgitation. The peak instantaneous gradient of the aortic valve is 9.8 mmHg. The mean gradient of the aortic valve is 5.0 mmHg.  Mitral Valve: The  mitral valve is normal in structure. There is no evidence of mitral valve regurgitation.  Tricuspid Valve: The tricuspid valve is structurally normal. There is trace tricuspid regurgitation.  Pulmonic Valve: The pulmonic valve is not well visualized. There is trace pulmonic valve regurgitation.  Pericardium: There is no pericardial effusion noted.  Aorta: The aortic root is normal. There is mild dilatation of the ascending aorta.      CONCLUSIONS:  1. Left ventricular systolic function is normal with a 50-55% estimated ejection fraction.    QUANTITATIVE DATA SUMMARY:  2D MEASUREMENTS:  Normal Ranges:  LAs:           4.07 cm    (2.7-4.0cm)  IVSd:          1.12 cm    (0.6-1.1cm)  LVPWd:         0.95 cm    (0.6-1.1cm)  LVIDd:         5.01 cm    (3.9-5.9cm)  LVIDs:         3.82 cm  LV Mass Index: 104.3 g/m2  LV % FS        23.6 %    LA VOLUME:  Normal Ranges:  LA Vol A4C:        50.3 ml    (22+/-6mL/m2)  LA Vol A2C:        28.2 ml  LA Vol BP:         41.8 ml  LA Vol Index A4C:  27.3 ml/m2  LA Vol Index A2C:  15.3 ml/m2  LA Vol Index BP:   22.7 ml/m2  LA Area A4C:       19.3 cm2  LA Area A2C:       13.0 cm2  LA Major Axis A4C: 6.3 cm  LA Major Axis A2C: 5.1 cm  LA Vol A4C:        49.4 ml  LA Vol A2C:        26.1 ml    RA VOLUME BY A/L METHOD:  Normal Ranges:  RA Area A4C: 13.0 cm2    AORTA MEASUREMENTS:  Normal Ranges:  Ao Sinus, d: 3.00 cm (2.1-3.5cm)  Ao STJ, d:   2.20 cm (1.7-3.4cm)  Asc Ao, d:   4.00 cm (2.1-3.4cm)    LV SYSTOLIC FUNCTION BY 2D PLANIMETRY (MOD):  Normal Ranges:  EF-A4C View: 50.9 % (>=55%)  EF-A2C View: 43.8 %  EF-Biplane:  48.0 %    LV DIASTOLIC FUNCTION:  Normal Ranges:  MV Peak E:    0.94 m/s    (0.7-1.2 m/s)  MV Peak A:    0.94 m/s    (0.42-0.7 m/s)  E/A Ratio:    1.00        (1.0-2.2)  MV e'         0.06 m/s    (>8.0)  MV lateral e' 0.06 m/s  MV medial e'  0.07 m/s  MV A Dur:     134.16 msec  E/e' Ratio:   14.44       (<8.0)    MITRAL VALVE:  Normal Ranges:  MV DT: 221 msec  (150-240msec)    AORTIC VALVE:  Normal Ranges:  AoV Vmax:                1.56 m/s (<=1.7m/s)  AoV Peak P.8 mmHg (<20mmHg)  AoV Mean P.0 mmHg (1.7-11.5mmHg)  LVOT Max Terrence:            1.18 m/s (<=1.1m/s)  AoV VTI:                 32.34 cm (18-25cm)  LVOT VTI:                26.14 cm  LVOT Diameter:           1.96 cm  (1.8-2.4cm)  AoV Area, VTI:           2.44 cm2 (2.5-5.5cm2)  AoV Area,Vmax:           2.28 cm2 (2.5-4.5cm2)  AoV Dimensionless Index: 0.81      RIGHT VENTRICLE:  RV Basal 3.58 cm  RV Mid   2.50 cm  RV Major 7.6 cm  TAPSE:   20.6 mm  RV s'    0.12 m/s    TRICUSPID VALVE/RVSP:  Normal Ranges:  IVC Diam: 1.14 cm  TV e'     0.1 m/s    AORTA:  Asc Ao Diam 3.97 cm      [id number] Homer Gage MD  Electronically signed on 2023 at 9:58:52 AM         Final     Stress Testing IMGRESULT([id number]:1825): No results found for this or any previous visit from the past 5 days.    Cardiac Catheterization: No results found for this or any previous visit from the past 5 days.  No results found for this or any previous visit from the past 3650 days.     Cardiac Scoring: No results found for this or any previous visit from the past 1825 days.    AAA : No results found for this or any previous visit from the past 1825 days.    OTHER: No results found for this or any previous visit from the past 1825 days.    LAST IMAGING RESULTS  Transthoracic Echo (TTE) [place] [id number]       Phone [phone] Fax [phone]    TRANSTHORACIC ECHOCARDIOGRAM REPORT    Patient Name:      CHARLIE SUSANA Vo Physician:    [id number] Daquan Sanchez MD  Study Date:        2024            Ordering Provider:    [id number] SARA CALDWELL  MRN/PID:           [id number]             Fellow:  Accession#:        [id number]         Nurse:  Date of Birth/Age: 1950 / 73       Sonographer:          Laina Amato RDCS                     years  Gender:            F                    Additional Staff:  Height:            147.32 cm            Admit Date:           9/23/2024  Weight:            84.82 kg             Admission Status:     Outpatient  BSA / BMI:         1.77 m2 / 39.08      Department Location:  [facility] Echo                     kg/m2                                      Lab  Blood Pressure: 120 /60 mmHg    Study Type:    TRANSTHORACIC ECHO (TTE) COMPLETE  Diagnosis/ICD: Atherosclerotic heart disease of native coronary artery without                 angina pectoris-I25.10; Aneurysm of the ascending aorta, without                 rupture-I71.21  Indication:    Aneurysm of ascending aorta  CPT Codes:     Echo Complete w Full Doppler-[id number]    Patient History:  Pertinent History: HTN.    Study Detail: The following Echo studies were performed: 2D, M-Mode, Doppler and                color flow.       PHYSICIAN INTERPRETATION:  Left Ventricle: Left ventricular ejection fraction is low normal, by visual estimate at 55%. There are no regional wall motion abnormalities. The left ventricular cavity size is normal. The left ventricular septal wall thickness is normal. There is normal left ventricular posterior wall thickness. Left Ventricular Global Longitudinal Strain - 17.0 %. Spectral Doppler shows a normal pattern of left ventricular diastolic filling.  Left Atrium: The left atrium is normal in size.  Right Ventricle: The right ventricle is normal in size. There is normal right ventricular global systolic function.  Right Atrium: The right atrium is normal in size.  Aortic Valve: The aortic valve is trileaflet. There is mild aortic valve cusp calcification. There is mild aortic valve thickening. There is no evidence of aortic valve regurgitation. The peak instantaneous gradient of the aortic valve is 9.5 mmHg. The mean gradient of the aortic valve is 4.6 mmHg.  Mitral Valve: The  mitral valve is normal in structure. There is no evidence of mitral valve regurgitation.  Tricuspid Valve: The tricuspid valve is structurally normal. No evidence of tricuspid regurgitation.  Pulmonic Valve: The pulmonic valve is structurally normal. There is no indication of pulmonic valve regurgitation.  Pericardium: No pericardial effusion noted.  Aorta: The aortic root is abnormal. There is mild dilatation of the ascending aorta.       CONCLUSIONS:   1. Left ventricular ejection fraction is low normal, by visual estimate at 55%.   2. There is normal right ventricular global systolic function.    QUANTITATIVE DATA SUMMARY:     2D MEASUREMENTS:            Normal Ranges:  LAs:             4.44 cm    (2.7-4.0cm)  IVSd:            1.04 cm    (0.6-1.1cm)  LVPWd:           1.01 cm    (0.6-1.1cm)  LVIDd:           5.17 cm    (3.9-5.9cm)  LVIDs:           3.43 cm  LV Mass Index:   112.5 g/m2  LV % FS          33.6 %       LA VOLUME:                    Normal Ranges:  LA Vol A4C:        41.1 ml    (22+/-6mL/m2)  LA Vol A2C:        45.0 ml  LA Vol BP:         43.4 ml  LA Vol Index A4C:  23.2ml/m2  LA Vol Index A2C:  25.4 ml/m2  LA Vol Index BP:   24.5 ml/m2  LA Area A4C:       16.3 cm2  LA Area A2C:       16.9 cm2  LA Major Axis A4C: 5.5 cm  LA Major Axis A2C: 5.4 cm  LA Volume Index:   24.5 ml/m2  LA Vol A4C:        38.8 ml  LA Vol A2C:        43.6 ml  LA Vol Index BSA:  23.3 ml/m2       RA VOLUME BY A/L METHOD:         Normal Ranges:  RA Area A4C:             9.8 cm2       AORTA MEASUREMENTS:         Normal Ranges:  Ao Sinus, d:        2.80 cm (2.1-3.5cm)  Ao STJ, d:          2.60 cm (1.7-3.4cm)  Asc Ao, d:          3.80 cm (2.1-3.4cm)       LV SYSTOLIC FUNCTION BY 2D PLANIMETRY (MOD):                                         Normal Ranges:  EF-Visual:                        55 %  EF-Auto:                          52 %  LV EF Reported:                   55 %  Global Longitudinal Strain (GLS): 17 %       LV DIASTOLIC  FUNCTION:           Normal Ranges:  MV Peak E:             0.95 m/s  (0.7-1.2 m/s)  MV Peak A:             0.95 m/s  (0.42-0.7 m/s)  E/A Ratio:             1.00      (1.0-2.2)  MV e'                  0.068 m/s (>8.0)  MV lateral e'          0.08 m/s  MV medial e'           0.06 m/s  E/e' Ratio:            13.98     (<8.0)       MITRAL VALVE:          Normal Ranges:  MV DT:        254 msec (150-240msec)       AORTIC VALVE:           Normal Ranges:  AoV Vmax:      1.54 m/s (<=1.7m/s)  AoV Peak P.5 mmHg (<20mmHg)  AoV Mean P.6 mmHg (1.7-11.5mmHg)  AoV VTI:       31.29 cm (18-25cm)  LVOT Diameter: 2.00 cm  (1.8-2.4cm)       RIGHT VENTRICLE:  RV Basal 2.42 cm  RV Mid   1.90 cm  RV Major 6.0 cm  TAPSE:   28.5 mm  RV s'    0.13 m/s       TRICUSPID VALVE/RVSP:         Normal Ranges:  IVC Diam:             0.78 cm  TV e'                 0.1 m/s       AORTA:  Asc Ao Diam 3.83 cm       [id number] Daquan Sanchez MD  Electronically signed on 2024 at 1:20:40 PM       ** Final **    Problem List Items Addressed This Visit       Atherosclerotic heart disease of native coronary artery without angina pectoris - Primary    Benign essential hypertension    Thoracic ascending aortic aneurysm (CMS-HCC)    Dyslipidemia    Severe obesity (Multi)            Jesu Butts DO, FACC, FACOI

## 2024-10-14 ENCOUNTER — APPOINTMENT (OUTPATIENT)
Dept: CARDIOLOGY | Facility: CLINIC | Age: 74
End: 2024-10-14
Payer: COMMERCIAL

## 2024-10-14 VITALS
DIASTOLIC BLOOD PRESSURE: 72 MMHG | HEART RATE: 74 BPM | SYSTOLIC BLOOD PRESSURE: 112 MMHG | BODY MASS INDEX: 37.7 KG/M2 | HEIGHT: 59 IN | WEIGHT: 187 LBS

## 2024-10-14 DIAGNOSIS — E78.5 DYSLIPIDEMIA: ICD-10-CM

## 2024-10-14 DIAGNOSIS — E66.01 SEVERE OBESITY (MULTI): ICD-10-CM

## 2024-10-14 DIAGNOSIS — I10 BENIGN ESSENTIAL HYPERTENSION: ICD-10-CM

## 2024-10-14 DIAGNOSIS — I25.10 ATHEROSCLEROSIS OF NATIVE CORONARY ARTERY OF NATIVE HEART WITHOUT ANGINA PECTORIS: Primary | ICD-10-CM

## 2024-10-14 DIAGNOSIS — I71.21 ANEURYSM OF ASCENDING AORTA WITHOUT RUPTURE (CMS-HCC): ICD-10-CM

## 2024-10-14 PROCEDURE — 1036F TOBACCO NON-USER: CPT | Performed by: INTERNAL MEDICINE

## 2024-10-14 PROCEDURE — 3048F LDL-C <100 MG/DL: CPT | Performed by: INTERNAL MEDICINE

## 2024-10-14 PROCEDURE — 1159F MED LIST DOCD IN RCRD: CPT | Performed by: INTERNAL MEDICINE

## 2024-10-14 PROCEDURE — 4010F ACE/ARB THERAPY RXD/TAKEN: CPT | Performed by: INTERNAL MEDICINE

## 2024-10-14 PROCEDURE — 93010 ELECTROCARDIOGRAM REPORT: CPT | Performed by: INTERNAL MEDICINE

## 2024-10-14 PROCEDURE — 3008F BODY MASS INDEX DOCD: CPT | Performed by: INTERNAL MEDICINE

## 2024-10-14 PROCEDURE — 99214 OFFICE O/P EST MOD 30 MIN: CPT | Performed by: INTERNAL MEDICINE

## 2024-10-14 PROCEDURE — 3044F HG A1C LEVEL LT 7.0%: CPT | Performed by: INTERNAL MEDICINE

## 2024-10-14 PROCEDURE — 3078F DIAST BP <80 MM HG: CPT | Performed by: INTERNAL MEDICINE

## 2024-10-14 PROCEDURE — 93005 ELECTROCARDIOGRAM TRACING: CPT | Performed by: INTERNAL MEDICINE

## 2024-10-14 PROCEDURE — 3061F NEG MICROALBUMINURIA REV: CPT | Performed by: INTERNAL MEDICINE

## 2024-10-14 PROCEDURE — 3074F SYST BP LT 130 MM HG: CPT | Performed by: INTERNAL MEDICINE

## 2024-11-07 ENCOUNTER — LAB (OUTPATIENT)
Dept: LAB | Facility: LAB | Age: 74
End: 2024-11-07
Payer: COMMERCIAL

## 2024-11-07 DIAGNOSIS — E11.65 TYPE 2 DIABETES MELLITUS WITH HYPERGLYCEMIA, WITHOUT LONG-TERM CURRENT USE OF INSULIN: ICD-10-CM

## 2024-11-07 LAB
ALBUMIN SERPL BCP-MCNC: 3.5 G/DL (ref 3.4–5)
ALP SERPL-CCNC: 82 U/L (ref 33–136)
ALT SERPL W P-5'-P-CCNC: 15 U/L (ref 7–45)
ANION GAP SERPL CALC-SCNC: 10 MMOL/L (ref 10–20)
AST SERPL W P-5'-P-CCNC: 25 U/L (ref 9–39)
BILIRUB SERPL-MCNC: 0.5 MG/DL (ref 0–1.2)
BUN SERPL-MCNC: 16 MG/DL (ref 6–23)
CALCIUM SERPL-MCNC: 8.6 MG/DL (ref 8.6–10.3)
CHLORIDE SERPL-SCNC: 103 MMOL/L (ref 98–107)
CHOLEST SERPL-MCNC: 115 MG/DL (ref 0–199)
CHOLESTEROL/HDL RATIO: 3.2
CO2 SERPL-SCNC: 30 MMOL/L (ref 21–32)
CREAT SERPL-MCNC: 0.7 MG/DL (ref 0.5–1.05)
CREAT UR-MCNC: 127.3 MG/DL (ref 20–320)
EGFRCR SERPLBLD CKD-EPI 2021: >90 ML/MIN/1.73M*2
EST. AVERAGE GLUCOSE BLD GHB EST-MCNC: 120 MG/DL
GLUCOSE SERPL-MCNC: 145 MG/DL (ref 74–99)
HBA1C MFR BLD: 5.8 %
HDLC SERPL-MCNC: 35.8 MG/DL
LDLC SERPL CALC-MCNC: 61 MG/DL
MICROALBUMIN UR-MCNC: 13.6 MG/L
MICROALBUMIN/CREAT UR: 10.7 UG/MG CREAT
NON HDL CHOLESTEROL: 79 MG/DL (ref 0–149)
POTASSIUM SERPL-SCNC: 4 MMOL/L (ref 3.5–5.3)
PROT SERPL-MCNC: 7.2 G/DL (ref 6.4–8.2)
SODIUM SERPL-SCNC: 139 MMOL/L (ref 136–145)
TRIGL SERPL-MCNC: 92 MG/DL (ref 0–149)
VLDL: 18 MG/DL (ref 0–40)

## 2024-11-07 PROCEDURE — 80053 COMPREHEN METABOLIC PANEL: CPT

## 2024-11-07 PROCEDURE — 36415 COLL VENOUS BLD VENIPUNCTURE: CPT

## 2024-11-07 PROCEDURE — 80061 LIPID PANEL: CPT

## 2024-11-07 PROCEDURE — 82570 ASSAY OF URINE CREATININE: CPT

## 2024-11-07 PROCEDURE — 83036 HEMOGLOBIN GLYCOSYLATED A1C: CPT

## 2024-11-07 PROCEDURE — 82043 UR ALBUMIN QUANTITATIVE: CPT

## 2024-11-13 ENCOUNTER — APPOINTMENT (OUTPATIENT)
Dept: PRIMARY CARE | Facility: CLINIC | Age: 74
End: 2024-11-13
Payer: COMMERCIAL

## 2024-11-13 VITALS
HEART RATE: 76 BPM | SYSTOLIC BLOOD PRESSURE: 109 MMHG | DIASTOLIC BLOOD PRESSURE: 70 MMHG | BODY MASS INDEX: 39.15 KG/M2 | WEIGHT: 194.2 LBS | OXYGEN SATURATION: 95 % | HEIGHT: 59 IN

## 2024-11-13 DIAGNOSIS — E11.65 TYPE 2 DIABETES MELLITUS WITH HYPERGLYCEMIA, WITHOUT LONG-TERM CURRENT USE OF INSULIN: ICD-10-CM

## 2024-11-13 DIAGNOSIS — E78.5 DYSLIPIDEMIA ASSOCIATED WITH TYPE 2 DIABETES MELLITUS (MULTI): ICD-10-CM

## 2024-11-13 DIAGNOSIS — M81.0 POSTMENOPAUSAL BONE LOSS: ICD-10-CM

## 2024-11-13 DIAGNOSIS — E11.69 DYSLIPIDEMIA ASSOCIATED WITH TYPE 2 DIABETES MELLITUS (MULTI): ICD-10-CM

## 2024-11-13 DIAGNOSIS — F32.5 MAJOR DEPRESSION IN REMISSION (CMS-HCC): ICD-10-CM

## 2024-11-13 DIAGNOSIS — K74.69 CRYPTOGENIC CIRRHOSIS (MULTI): ICD-10-CM

## 2024-11-13 DIAGNOSIS — Z00.00 HEALTHCARE MAINTENANCE: ICD-10-CM

## 2024-11-13 DIAGNOSIS — E66.812 CLASS 2 SEVERE OBESITY DUE TO EXCESS CALORIES WITH SERIOUS COMORBIDITY AND BODY MASS INDEX (BMI) OF 39.0 TO 39.9 IN ADULT: ICD-10-CM

## 2024-11-13 DIAGNOSIS — Z12.11 COLON CANCER SCREENING: ICD-10-CM

## 2024-11-13 DIAGNOSIS — I77.810 MILD ASCENDING AORTA DILATATION (CMS-HCC): ICD-10-CM

## 2024-11-13 DIAGNOSIS — J45.30 MILD PERSISTENT ASTHMATIC BRONCHITIS WITHOUT COMPLICATION (HHS-HCC): ICD-10-CM

## 2024-11-13 DIAGNOSIS — E66.01 CLASS 2 SEVERE OBESITY DUE TO EXCESS CALORIES WITH SERIOUS COMORBIDITY AND BODY MASS INDEX (BMI) OF 39.0 TO 39.9 IN ADULT: ICD-10-CM

## 2024-11-13 DIAGNOSIS — I50.32 HYPERTENSIVE HEART DISEASE WITH CHRONIC DIASTOLIC CONGESTIVE HEART FAILURE: ICD-10-CM

## 2024-11-13 DIAGNOSIS — I11.0 HYPERTENSIVE HEART DISEASE WITH CHRONIC DIASTOLIC CONGESTIVE HEART FAILURE: ICD-10-CM

## 2024-11-13 PROBLEM — I10 BENIGN ESSENTIAL HYPERTENSION: Status: RESOLVED | Noted: 2023-05-10 | Resolved: 2024-11-13

## 2024-11-13 PROCEDURE — 3078F DIAST BP <80 MM HG: CPT | Performed by: INTERNAL MEDICINE

## 2024-11-13 PROCEDURE — 3008F BODY MASS INDEX DOCD: CPT | Performed by: INTERNAL MEDICINE

## 2024-11-13 PROCEDURE — 90662 IIV NO PRSV INCREASED AG IM: CPT | Performed by: INTERNAL MEDICINE

## 2024-11-13 PROCEDURE — 3061F NEG MICROALBUMINURIA REV: CPT | Performed by: INTERNAL MEDICINE

## 2024-11-13 PROCEDURE — 1036F TOBACCO NON-USER: CPT | Performed by: INTERNAL MEDICINE

## 2024-11-13 PROCEDURE — 1160F RVW MEDS BY RX/DR IN RCRD: CPT | Performed by: INTERNAL MEDICINE

## 2024-11-13 PROCEDURE — 3044F HG A1C LEVEL LT 7.0%: CPT | Performed by: INTERNAL MEDICINE

## 2024-11-13 PROCEDURE — 3048F LDL-C <100 MG/DL: CPT | Performed by: INTERNAL MEDICINE

## 2024-11-13 PROCEDURE — 1159F MED LIST DOCD IN RCRD: CPT | Performed by: INTERNAL MEDICINE

## 2024-11-13 PROCEDURE — 4010F ACE/ARB THERAPY RXD/TAKEN: CPT | Performed by: INTERNAL MEDICINE

## 2024-11-13 PROCEDURE — G0008 ADMIN INFLUENZA VIRUS VAC: HCPCS | Performed by: INTERNAL MEDICINE

## 2024-11-13 PROCEDURE — 99214 OFFICE O/P EST MOD 30 MIN: CPT | Performed by: INTERNAL MEDICINE

## 2024-11-13 PROCEDURE — 3074F SYST BP LT 130 MM HG: CPT | Performed by: INTERNAL MEDICINE

## 2024-11-13 RX ORDER — SERTRALINE HYDROCHLORIDE 150 MG/1
1 CAPSULE ORAL NIGHTLY
Qty: 90 CAPSULE | Refills: 3 | Status: SHIPPED | OUTPATIENT
Start: 2024-11-13 | End: 2025-11-13

## 2024-11-13 ASSESSMENT — ENCOUNTER SYMPTOMS
LOSS OF SENSATION IN FEET: 0
OCCASIONAL FEELINGS OF UNSTEADINESS: 0
DEPRESSION: 0
INSOMNIA: 1

## 2024-11-13 ASSESSMENT — PATIENT HEALTH QUESTIONNAIRE - PHQ9
1. LITTLE INTEREST OR PLEASURE IN DOING THINGS: NEARLY EVERY DAY
3. TROUBLE FALLING OR STAYING ASLEEP OR SLEEPING TOO MUCH: NEARLY EVERY DAY
6. FEELING BAD ABOUT YOURSELF - OR THAT YOU ARE A FAILURE OR HAVE LET YOURSELF OR YOUR FAMILY DOWN: NEARLY EVERY DAY
2. FEELING DOWN, DEPRESSED OR HOPELESS: NEARLY EVERY DAY
5. POOR APPETITE OR OVEREATING: NEARLY EVERY DAY
7. TROUBLE CONCENTRATING ON THINGS, SUCH AS READING THE NEWSPAPER OR WATCHING TELEVISION: NOT AT ALL
SUM OF ALL RESPONSES TO PHQ9 QUESTIONS 1 AND 2: 6
SUM OF ALL RESPONSES TO PHQ QUESTIONS 1-9: 18
8. MOVING OR SPEAKING SO SLOWLY THAT OTHER PEOPLE COULD HAVE NOTICED. OR THE OPPOSITE, BEING SO FIGETY OR RESTLESS THAT YOU HAVE BEEN MOVING AROUND A LOT MORE THAN USUAL: NOT AT ALL
10. IF YOU CHECKED OFF ANY PROBLEMS, HOW DIFFICULT HAVE THESE PROBLEMS MADE IT FOR YOU TO DO YOUR WORK, TAKE CARE OF THINGS AT HOME, OR GET ALONG WITH OTHER PEOPLE: VERY DIFFICULT
9. THOUGHTS THAT YOU WOULD BE BETTER OFF DEAD, OR OF HURTING YOURSELF: NOT AT ALL
4. FEELING TIRED OR HAVING LITTLE ENERGY: NEARLY EVERY DAY

## 2024-11-13 NOTE — ASSESSMENT & PLAN NOTE
Well compensated blood pressure stable on carvedilol 25 mg twice a day with irbesartan 150 mg daily amlodipine 2.5 mg daily  Orders:    Follow Up In Advanced [facility] Care - PCP - [facility] DEXA bone density; Future    Cologuard® colon cancer screening; Future    Comprehensive Metabolic Panel; Future    Hemoglobin A1C; Future    Lipid Panel; Future    Albumin-Creatinine Ratio, Urine Random; Future    Follow Up In Advanced Primary Care - PCP - Medicare Annual; Future    Cologuard® colon cancer screening

## 2024-11-13 NOTE — ASSESSMENT & PLAN NOTE
Hemoglobin A1c improved to 5.8 fasting blood sugar 145 no microalbuminuria continue with diet control at this time  Orders:    Follow Up In Advanced [facility] Care - PCP - [facility]    XR DEXA bone density; Future    Cologuard® colon cancer screening; Future    Comprehensive Metabolic Panel; Future    Hemoglobin A1C; Future    Lipid Panel; Future    Albumin-Creatinine Ratio, Urine Random; Future    Follow Up In Advanced [facility] Care - PCP - Medicare Annual; Future    Cologuard® colon cancer screening

## 2024-11-13 NOTE — PROGRESS NOTES
"Subjective   Reason for Visit: Purnima Puente is an 74 y.o. female here for a follow-up diabetes visit.     Past Medical, Surgical, and Family History reviewed and updated in chart.         Insomnia  This is a chronic problem. The current episode started more than 1 year ago. The problem occurs constantly. The problem has been waxing and waning. Nothing aggravates the symptoms. She has tried nothing for the symptoms. The treatment provided no relief.       Patient Care Team:  Chevy Onofre DO as PCP - General  Chevy Onofre DO as PCP - [employer] PCP  Chevy Onofre DO as PCP - [employer]vantage PCP     Review of Systems   Psychiatric/Behavioral:  Positive for dysphoric mood and sleep disturbance. The patient is nervous/anxious and has insomnia.    All other systems reviewed and are negative.      Objective   Vitals:  /70   Pulse 76   Ht 1.499 m (4' 11\")   Wt 88.1 kg (194 lb 3.2 oz)   SpO2 95%   BMI 39.22 kg/m²       Physical Exam  Vitals and nursing note reviewed.   Constitutional:       General: She is not in acute distress.     Appearance: Normal appearance. She is well-developed. She is obese. She is not toxic-appearing.   HENT:      Head: Normocephalic and atraumatic.      Right Ear: Tympanic membrane and external ear normal.      Left Ear: Tympanic membrane and external ear normal.      Nose: Nose normal.      Mouth/Throat:      Mouth: Mucous membranes are moist.      Pharynx: Oropharynx is clear. No oropharyngeal exudate or posterior oropharyngeal erythema.      Tonsils: No tonsillar exudate. 2+ on the right. 2+ on the left.   Eyes:      Extraocular Movements: Extraocular movements intact.      Conjunctiva/sclera: Conjunctivae normal.   Cardiovascular:      Rate and Rhythm: Normal rate and regular rhythm.      Pulses: Normal pulses.      Heart sounds: Normal heart sounds. No murmur heard.  Pulmonary:      Effort: Pulmonary effort is normal.     "  Breath sounds: Normal breath sounds.   Abdominal:      General: Abdomen is flat. Bowel sounds are normal.      Palpations: Abdomen is soft.   Musculoskeletal:      Cervical back: Neck supple.   Lymphadenopathy:      Cervical: No cervical adenopathy.   Skin:     General: Skin is warm and dry.      Findings: No rash.   Neurological:      Mental Status: She is alert. Mental status is at baseline.   Psychiatric:         Mood and Affect: Mood normal.         Behavior: Behavior normal.         Thought Content: Thought content normal.         Judgment: Judgment normal.     Lifestyle Recommendations  I recommend a whole-food plant-based diet, an eating pattern that encourages the consumption of unrefined plant foods (such as fruits, vegetables, tubers, whole grains, legumes, nuts and seeds) and discourages meats, dairy products, eggs and processed foods.     The AHA/ACC recommends that the patient consume a dietary pattern that emphasizes intake of vegetables, fruits, and whole grains; includes low-fat dairy products, poultry, fish, legumes, non-tropical vegetable oils, and nuts; and limits intake of sodium, sweets, sugar-sweetened beverages, and red meats.  Adapt this dietary pattern to appropriate calorie requirements (a 500-750 kcal/day deficit to loose weight), personal and cultural food preferences, and nutrition therapy for other medical conditions (including diabetes).  Achieve this pattern by following plans such as the [place] Mediterranean, DASH dietary pattern, or AHA diet.     Engage in 2 hours and 30 minutes per week of moderate-intensity physical activity, or 1 hour and 15 minutes (75 minutes) per week of vigorous-intensity aerobic physical activity, or an equivalent combination of moderate and vigorous-intensity aerobic physical activity. Aerobic activity should be performed in episodes of at least 10 minutes preferably spread throughout the week.     Adhering to a heart healthy diet, regular exercise habits,  avoidance of tobacco products, and maintenance of a healthy weight are crucial components of their heart disease risk reduction.    Assessment & Plan  Type 2 diabetes mellitus with hyperglycemia, without long-term current use of insulin  Hemoglobin A1c improved to 5.8 fasting blood sugar 145 no microalbuminuria continue with diet control at this time  Orders:    Follow Up In Advanced [facility] Care - PCP - Established    XR DEXA bone density; Future    Cologuard® colon cancer screening; Future    Comprehensive Metabolic Panel; Future    Hemoglobin A1C; Future    Lipid Panel; Future    Albumin-Creatinine Ratio, Urine Random; Future    Follow Up In Advanced [facility] Care - PCP - Medicare Annual; Future    Cologuard® colon cancer screening    Hypertensive heart disease with chronic diastolic congestive heart failure  Well compensated blood pressure stable on carvedilol 25 mg twice a day with irbesartan 150 mg daily amlodipine 2.5 mg daily  Orders:    Follow Up In Advanced [facility] Care - PCP - Established    XR DEXA bone density; Future    Cologuard® colon cancer screening; Future    Comprehensive Metabolic Panel; Future    Hemoglobin A1C; Future    Lipid Panel; Future    Albumin-Creatinine Ratio, Urine Random; Future    Follow Up In Advanced [facility] Care - PCP - Medicare Annual; Future    Cologuard® colon cancer screening    Dyslipidemia associated with type 2 diabetes mellitus (Multi)  LDL cholesterol optimal 61 continue pravastatin 40 mg daily  Orders:    Follow Up In Advanced [facility] Care - PCP - Established    Class 2 severe obesity due to excess calories with serious comorbidity and body mass index (BMI) of 39.0 to 39.9 in adult  Continue to work on diet exercise changes to improve overall BMI       Mild ascending aorta dilatation (CMS-HCC)  Continue to monitor with annual echocardiograms stable at this time transthoracic echo completed September 23, 2024 with ascending aortic dilatation of 3.8 cm at this time  continue to optimize blood pressure control preserved ejection fraction 55% with normal RV functionAnd no significant valvulopathy       Cryptogenic cirrhosis (Multi)  Well compensated no evidence of inflammation of liver at this time continue to monitor with aggressive diet exercise and diabetes control       Mild persistent asthmatic bronchitis without complication (HHS-HCC)  Stable on Symbicort 160/4.52 puffs twice a day       Major depression in remission (CMS-HCC)  Increase sertraline to 150 mg from 100 mg 1 capsule at bedtime continue Cymbalta for chronic neuropathic pain 60 mg daily  Orders:    sertraline 150 mg capsule; Take 1 capsule (150 mg) by mouth once daily at bedtime.    Postmenopausal bone loss  Reevaluate bone density with next visit  Orders:    XR DEXA bone density; Future    Colon cancer screening  Cologuard testing ordered for next visit for colon cancer screening  Orders:    Cologuard® colon cancer screening; Future    Cologuard® colon cancer screening    Healthcare maintenance  High-dose influenza vaccine updated today  Orders:    Flu vaccine, trivalent, preservative free, HIGH-DOSE, age 65y+ (Fluzone)            Patient was identified as a fall risk. Risk prevention instructions provided.

## 2024-11-13 NOTE — PROGRESS NOTES
"Subjective   Patient ID: Purnima Puente is a 74 y.o. female who presents for Follow-up (6 month) and Insomnia.    HPI     Review of Systems    Objective   /70   Pulse 76   Ht 1.499 m (4' 11\")   Wt 88.1 kg (194 lb 3.2 oz)   SpO2 95%   BMI 39.22 kg/m²     Physical Exam    Assessment/Plan          "

## 2024-11-13 NOTE — ASSESSMENT & PLAN NOTE
Continue to monitor with annual echocardiograms stable at this time transthoracic echo completed September 23, 2024 with ascending aortic dilatation of 3.8 cm at this time continue to optimize blood pressure control preserved ejection fraction 55% with normal RV functionAnd no significant valvulopathy

## 2024-11-13 NOTE — ASSESSMENT & PLAN NOTE
LDL cholesterol optimal 61 continue pravastatin 40 mg daily  Orders:    Follow Up In Advanced Primary Care - PCP - Established

## 2024-11-13 NOTE — ASSESSMENT & PLAN NOTE
Well compensated no evidence of inflammation of liver at this time continue to monitor with aggressive diet exercise and diabetes control

## 2024-11-13 NOTE — ASSESSMENT & PLAN NOTE
Increase sertraline to 150 mg from 100 mg 1 capsule at bedtime continue Cymbalta for chronic neuropathic pain 60 mg daily  Orders:    sertraline 150 mg capsule; Take 1 capsule (150 mg) by mouth once daily at bedtime.

## 2024-11-14 RX ORDER — SERTRALINE HYDROCHLORIDE 100 MG/1
100 TABLET, FILM COATED ORAL DAILY
COMMUNITY

## 2024-11-22 NOTE — ASSESSMENT & PLAN NOTE
Cologuard testing ordered for next visit for colon cancer screening  Orders:    Cologuard® colon cancer screening; Future    Cologuard® colon cancer screening

## 2024-11-22 NOTE — ADDENDUM NOTE
Addended by: SHAUN COATES on: 11/22/2024 02:33 PM     Modules accepted: Orders, Level of Service

## 2024-12-11 ENCOUNTER — DOCUMENTATION (OUTPATIENT)
Dept: PRIMARY CARE | Facility: CLINIC | Age: 74
End: 2024-12-11
Payer: COMMERCIAL

## 2024-12-11 ENCOUNTER — TELEPHONE (OUTPATIENT)
Dept: PRIMARY CARE | Facility: CLINIC | Age: 74
End: 2024-12-11
Payer: COMMERCIAL

## 2024-12-11 DIAGNOSIS — E78.5 DYSLIPIDEMIA, GOAL LDL BELOW 100: ICD-10-CM

## 2024-12-11 RX ORDER — PRAVASTATIN SODIUM 40 MG/1
40 TABLET ORAL DAILY
Qty: 90 TABLET | Refills: 3 | Status: SHIPPED | OUTPATIENT
Start: 2024-12-11

## 2024-12-11 NOTE — TELEPHONE ENCOUNTER
[facility] left a vm stating that the medication pravastatin is non adherence. It  Oct 12 and and they were waiting for a refill but never received the request.   [employer] stated that they called the patient and the patient wasn't taking it and she has been feeling depressed a lot because her   two weeks ago   I tired to reach out to the patient but the voicemail box was not set up, then I reached out to her emergency contact and he said he will have her call the office.

## 2025-04-21 ENCOUNTER — TELEPHONE (OUTPATIENT)
Dept: PRIMARY CARE | Facility: CLINIC | Age: 75
End: 2025-04-21
Payer: COMMERCIAL

## 2025-04-21 NOTE — TELEPHONE ENCOUNTER
Patient dropped off paperwork that needs filled out and faxed to her insurance company. Put in Dr Carlos box. Please let patient know when this is done and she can pick it up.

## 2025-05-06 LAB
ALBUMIN SERPL-MCNC: 3.5 G/DL (ref 3.6–5.1)
ALBUMIN SERPL-MCNC: 3.6 G/DL (ref 3.6–5.1)
ALP SERPL-CCNC: 76 U/L (ref 37–153)
ALP SERPL-CCNC: 77 U/L (ref 37–153)
ALT SERPL-CCNC: 11 U/L (ref 6–29)
ALT SERPL-CCNC: 12 U/L (ref 6–29)
ANION GAP SERPL CALCULATED.4IONS-SCNC: 8 MMOL/L (CALC) (ref 7–17)
ANION GAP SERPL CALCULATED.4IONS-SCNC: 9 MMOL/L (CALC) (ref 7–17)
AST SERPL-CCNC: 25 U/L (ref 10–35)
AST SERPL-CCNC: 25 U/L (ref 10–35)
BILIRUB SERPL-MCNC: 0.6 MG/DL (ref 0.2–1.2)
BILIRUB SERPL-MCNC: 0.6 MG/DL (ref 0.2–1.2)
BUN SERPL-MCNC: 14 MG/DL (ref 7–25)
BUN SERPL-MCNC: 14 MG/DL (ref 7–25)
CALCIUM SERPL-MCNC: 8.5 MG/DL (ref 8.6–10.4)
CALCIUM SERPL-MCNC: 8.6 MG/DL (ref 8.6–10.4)
CHLORIDE SERPL-SCNC: 104 MMOL/L (ref 98–110)
CHLORIDE SERPL-SCNC: 104 MMOL/L (ref 98–110)
CHOLEST SERPL-MCNC: 117 MG/DL
CHOLEST SERPL-MCNC: 118 MG/DL
CHOLEST/HDLC SERPL: 3.5 (CALC)
CHOLEST/HDLC SERPL: 3.5 (CALC)
CO2 SERPL-SCNC: 27 MMOL/L (ref 20–32)
CO2 SERPL-SCNC: 27 MMOL/L (ref 20–32)
CREAT SERPL-MCNC: 0.67 MG/DL (ref 0.6–1)
CREAT SERPL-MCNC: 0.69 MG/DL (ref 0.6–1)
EGFRCR SERPLBLD CKD-EPI 2021: 92 ML/MIN/1.73M2
EGFRCR SERPLBLD CKD-EPI 2021: 92 ML/MIN/1.73M2
ERYTHROCYTE [DISTWIDTH] IN BLOOD BY AUTOMATED COUNT: 15.2 % (ref 11–15)
EST. AVERAGE GLUCOSE BLD GHB EST-MCNC: 171 MG/DL
EST. AVERAGE GLUCOSE BLD GHB EST-SCNC: 9.5 MMOL/L
GLUCOSE SERPL-MCNC: 153 MG/DL (ref 65–99)
GLUCOSE SERPL-MCNC: 156 MG/DL (ref 65–99)
HBA1C MFR BLD: 7.6 %
HCT VFR BLD AUTO: 34.8 % (ref 35–45)
HDLC SERPL-MCNC: 33 MG/DL
HDLC SERPL-MCNC: 34 MG/DL
HGB BLD-MCNC: 11 G/DL (ref 11.7–15.5)
LDLC SERPL CALC-MCNC: 64 MG/DL (CALC)
LDLC SERPL CALC-MCNC: 65 MG/DL (CALC)
MAGNESIUM SERPL-MCNC: 2 MG/DL (ref 1.5–2.5)
MCH RBC QN AUTO: 25.9 PG (ref 27–33)
MCHC RBC AUTO-ENTMCNC: 31.6 G/DL (ref 32–36)
MCV RBC AUTO: 81.9 FL (ref 80–100)
NONHDLC SERPL-MCNC: 84 MG/DL (CALC)
NONHDLC SERPL-MCNC: 84 MG/DL (CALC)
PLATELET # BLD AUTO: 146 THOUSAND/UL (ref 140–400)
PMV BLD REES-ECKER: 10.8 FL (ref 7.5–12.5)
POTASSIUM SERPL-SCNC: 4.2 MMOL/L (ref 3.5–5.3)
POTASSIUM SERPL-SCNC: 4.3 MMOL/L (ref 3.5–5.3)
PROT SERPL-MCNC: 7.2 G/DL (ref 6.1–8.1)
PROT SERPL-MCNC: 7.2 G/DL (ref 6.1–8.1)
RBC # BLD AUTO: 4.25 MILLION/UL (ref 3.8–5.1)
SODIUM SERPL-SCNC: 139 MMOL/L (ref 135–146)
SODIUM SERPL-SCNC: 140 MMOL/L (ref 135–146)
TRIGL SERPL-MCNC: 109 MG/DL
TRIGL SERPL-MCNC: 112 MG/DL
WBC # BLD AUTO: 7.2 THOUSAND/UL (ref 3.8–10.8)

## 2025-05-13 ENCOUNTER — TELEPHONE (OUTPATIENT)
Dept: PRIMARY CARE | Facility: CLINIC | Age: 75
End: 2025-05-13

## 2025-05-13 ENCOUNTER — APPOINTMENT (OUTPATIENT)
Dept: PRIMARY CARE | Facility: CLINIC | Age: 75
End: 2025-05-13
Payer: COMMERCIAL

## 2025-05-13 VITALS
HEART RATE: 64 BPM | HEIGHT: 59 IN | BODY MASS INDEX: 39.92 KG/M2 | SYSTOLIC BLOOD PRESSURE: 106 MMHG | DIASTOLIC BLOOD PRESSURE: 60 MMHG | WEIGHT: 198 LBS

## 2025-05-13 DIAGNOSIS — Z00.00 ROUTINE GENERAL MEDICAL EXAMINATION AT HEALTH CARE FACILITY: ICD-10-CM

## 2025-05-13 DIAGNOSIS — K74.69 CRYPTOGENIC CIRRHOSIS (MULTI): ICD-10-CM

## 2025-05-13 DIAGNOSIS — E78.5 DYSLIPIDEMIA ASSOCIATED WITH TYPE 2 DIABETES MELLITUS: ICD-10-CM

## 2025-05-13 DIAGNOSIS — Z12.31 SCREENING MAMMOGRAM FOR BREAST CANCER: ICD-10-CM

## 2025-05-13 DIAGNOSIS — Z00.00 MEDICARE ANNUAL WELLNESS VISIT, SUBSEQUENT: Primary | ICD-10-CM

## 2025-05-13 DIAGNOSIS — I50.32 HYPERTENSIVE HEART DISEASE WITH CHRONIC DIASTOLIC CONGESTIVE HEART FAILURE: ICD-10-CM

## 2025-05-13 DIAGNOSIS — E11.65 TYPE 2 DIABETES MELLITUS WITH HYPERGLYCEMIA, WITHOUT LONG-TERM CURRENT USE OF INSULIN: ICD-10-CM

## 2025-05-13 DIAGNOSIS — E66.01 CLASS 2 SEVERE OBESITY DUE TO EXCESS CALORIES WITH SERIOUS COMORBIDITY AND BODY MASS INDEX (BMI) OF 39.0 TO 39.9 IN ADULT: ICD-10-CM

## 2025-05-13 DIAGNOSIS — I10 BENIGN ESSENTIAL HYPERTENSION: ICD-10-CM

## 2025-05-13 DIAGNOSIS — F33.2 SEVERE EPISODE OF RECURRENT MAJOR DEPRESSIVE DISORDER, WITHOUT PSYCHOTIC FEATURES (MULTI): ICD-10-CM

## 2025-05-13 DIAGNOSIS — Z12.11 COLON CANCER SCREENING: ICD-10-CM

## 2025-05-13 DIAGNOSIS — E66.812 CLASS 2 SEVERE OBESITY DUE TO EXCESS CALORIES WITH SERIOUS COMORBIDITY AND BODY MASS INDEX (BMI) OF 39.0 TO 39.9 IN ADULT: ICD-10-CM

## 2025-05-13 DIAGNOSIS — F32.5 MAJOR DEPRESSION IN REMISSION: ICD-10-CM

## 2025-05-13 DIAGNOSIS — I11.0 HYPERTENSIVE HEART DISEASE WITH CHRONIC DIASTOLIC CONGESTIVE HEART FAILURE: ICD-10-CM

## 2025-05-13 DIAGNOSIS — M81.0 POSTMENOPAUSAL BONE LOSS: ICD-10-CM

## 2025-05-13 DIAGNOSIS — K21.9 GASTROESOPHAGEAL REFLUX DISEASE WITHOUT ESOPHAGITIS: ICD-10-CM

## 2025-05-13 DIAGNOSIS — E78.5 DYSLIPIDEMIA, GOAL LDL BELOW 100: ICD-10-CM

## 2025-05-13 DIAGNOSIS — E11.69 DYSLIPIDEMIA ASSOCIATED WITH TYPE 2 DIABETES MELLITUS: ICD-10-CM

## 2025-05-13 PROCEDURE — 3008F BODY MASS INDEX DOCD: CPT | Performed by: INTERNAL MEDICINE

## 2025-05-13 PROCEDURE — G0439 PPPS, SUBSEQ VISIT: HCPCS | Performed by: INTERNAL MEDICINE

## 2025-05-13 PROCEDURE — 1123F ACP DISCUSS/DSCN MKR DOCD: CPT | Performed by: INTERNAL MEDICINE

## 2025-05-13 PROCEDURE — 3074F SYST BP LT 130 MM HG: CPT | Performed by: INTERNAL MEDICINE

## 2025-05-13 PROCEDURE — 3078F DIAST BP <80 MM HG: CPT | Performed by: INTERNAL MEDICINE

## 2025-05-13 PROCEDURE — 4010F ACE/ARB THERAPY RXD/TAKEN: CPT | Performed by: INTERNAL MEDICINE

## 2025-05-13 PROCEDURE — 1160F RVW MEDS BY RX/DR IN RCRD: CPT | Performed by: INTERNAL MEDICINE

## 2025-05-13 PROCEDURE — 1170F FXNL STATUS ASSESSED: CPT | Performed by: INTERNAL MEDICINE

## 2025-05-13 PROCEDURE — 1036F TOBACCO NON-USER: CPT | Performed by: INTERNAL MEDICINE

## 2025-05-13 PROCEDURE — 1159F MED LIST DOCD IN RCRD: CPT | Performed by: INTERNAL MEDICINE

## 2025-05-13 PROCEDURE — 1158F ADVNC CARE PLAN TLK DOCD: CPT | Performed by: INTERNAL MEDICINE

## 2025-05-13 RX ORDER — SERTRALINE HYDROCHLORIDE 150 MG/1
1 CAPSULE ORAL NIGHTLY
Qty: 90 CAPSULE | Refills: 3 | Status: SHIPPED | OUTPATIENT
Start: 2025-05-13 | End: 2026-05-13

## 2025-05-13 RX ORDER — AMLODIPINE BESYLATE 2.5 MG/1
2.5 TABLET ORAL DAILY
Qty: 90 TABLET | Refills: 3 | Status: SHIPPED | OUTPATIENT
Start: 2025-05-13

## 2025-05-13 RX ORDER — IRBESARTAN 150 MG/1
150 TABLET ORAL DAILY
Qty: 90 TABLET | Refills: 3 | Status: SHIPPED | OUTPATIENT
Start: 2025-05-13

## 2025-05-13 RX ORDER — PRAVASTATIN SODIUM 40 MG/1
40 TABLET ORAL DAILY
Qty: 90 TABLET | Refills: 3 | Status: SHIPPED | OUTPATIENT
Start: 2025-05-13

## 2025-05-13 RX ORDER — CARVEDILOL 25 MG/1
25 TABLET ORAL
Qty: 180 TABLET | Refills: 3 | Status: SHIPPED | OUTPATIENT
Start: 2025-05-13

## 2025-05-13 RX ORDER — PANTOPRAZOLE SODIUM 40 MG/1
40 TABLET, DELAYED RELEASE ORAL 2 TIMES DAILY
Qty: 180 TABLET | Refills: 3 | Status: SHIPPED | OUTPATIENT
Start: 2025-05-13

## 2025-05-13 RX ORDER — DULOXETIN HYDROCHLORIDE 60 MG/1
60 CAPSULE, DELAYED RELEASE ORAL DAILY
Qty: 90 CAPSULE | Refills: 3 | Status: SHIPPED | OUTPATIENT
Start: 2025-05-13

## 2025-05-13 ASSESSMENT — PATIENT HEALTH QUESTIONNAIRE - PHQ9
7. TROUBLE CONCENTRATING ON THINGS, SUCH AS READING THE NEWSPAPER OR WATCHING TELEVISION: MORE THAN HALF THE DAYS
3. TROUBLE FALLING OR STAYING ASLEEP OR SLEEPING TOO MUCH: SEVERAL DAYS
SUM OF ALL RESPONSES TO PHQ9 QUESTIONS 1 AND 2: 6
6. FEELING BAD ABOUT YOURSELF - OR THAT YOU ARE A FAILURE OR HAVE LET YOURSELF OR YOUR FAMILY DOWN: NOT AT ALL
8. MOVING OR SPEAKING SO SLOWLY THAT OTHER PEOPLE COULD HAVE NOTICED. OR THE OPPOSITE, BEING SO FIGETY OR RESTLESS THAT YOU HAVE BEEN MOVING AROUND A LOT MORE THAN USUAL: SEVERAL DAYS
2. FEELING DOWN, DEPRESSED OR HOPELESS: NEARLY EVERY DAY
9. THOUGHTS THAT YOU WOULD BE BETTER OFF DEAD, OR OF HURTING YOURSELF: MORE THAN HALF THE DAYS
SUM OF ALL RESPONSES TO PHQ QUESTIONS 1-9: 16
4. FEELING TIRED OR HAVING LITTLE ENERGY: NEARLY EVERY DAY
1. LITTLE INTEREST OR PLEASURE IN DOING THINGS: NEARLY EVERY DAY
5. POOR APPETITE OR OVEREATING: SEVERAL DAYS
10. IF YOU CHECKED OFF ANY PROBLEMS, HOW DIFFICULT HAVE THESE PROBLEMS MADE IT FOR YOU TO DO YOUR WORK, TAKE CARE OF THINGS AT HOME, OR GET ALONG WITH OTHER PEOPLE: EXTREMELY DIFFICULT

## 2025-05-13 ASSESSMENT — ACTIVITIES OF DAILY LIVING (ADL)
DOING_HOUSEWORK: INDEPENDENT
MANAGING_FINANCES: INDEPENDENT
GROCERY_SHOPPING: INDEPENDENT
DRESSING: INDEPENDENT
BATHING: INDEPENDENT
TAKING_MEDICATION: INDEPENDENT

## 2025-05-13 ASSESSMENT — ANXIETY QUESTIONNAIRES
IF YOU CHECKED OFF ANY PROBLEMS ON THIS QUESTIONNAIRE, HOW DIFFICULT HAVE THESE PROBLEMS MADE IT FOR YOU TO DO YOUR WORK, TAKE CARE OF THINGS AT HOME, OR GET ALONG WITH OTHER PEOPLE: VERY DIFFICULT
4. TROUBLE RELAXING: NEARLY EVERY DAY
5. BEING SO RESTLESS THAT IT IS HARD TO SIT STILL: SEVERAL DAYS
2. NOT BEING ABLE TO STOP OR CONTROL WORRYING: SEVERAL DAYS
6. BECOMING EASILY ANNOYED OR IRRITABLE: MORE THAN HALF THE DAYS
3. WORRYING TOO MUCH ABOUT DIFFERENT THINGS: NEARLY EVERY DAY
GAD7 TOTAL SCORE: 14
7. FEELING AFRAID AS IF SOMETHING AWFUL MIGHT HAPPEN: SEVERAL DAYS
1. FEELING NERVOUS, ANXIOUS, OR ON EDGE: NEARLY EVERY DAY

## 2025-05-13 ASSESSMENT — ENCOUNTER SYMPTOMS
LOSS OF SENSATION IN FEET: 0
DEPRESSION: 0
OCCASIONAL FEELINGS OF UNSTEADINESS: 0

## 2025-05-13 ASSESSMENT — COLUMBIA-SUICIDE SEVERITY RATING SCALE - C-SSRS
6. HAVE YOU EVER DONE ANYTHING, STARTED TO DO ANYTHING, OR PREPARED TO DO ANYTHING TO END YOUR LIFE?: NO
2. HAVE YOU ACTUALLY HAD ANY THOUGHTS OF KILLING YOURSELF?: NO
1. IN THE PAST MONTH, HAVE YOU WISHED YOU WERE DEAD OR WISHED YOU COULD GO TO SLEEP AND NOT WAKE UP?: YES

## 2025-05-13 NOTE — PROGRESS NOTES
"Subjective   Reason for Visit: Purnima Puente is an 74 y.o. female here for a Medicare Wellness visit.     Past Medical, Surgical, and Family History reviewed and updated in chart.    Reviewed all medications by prescribing practitioner or clinical pharmacist (such as prescriptions, OTCs, herbal therapies and supplements) and documented in the medical record.    HPI    Patient Care Team:  Chevy Onofre DO as PCP - General  Chevy Onofre DO as PCP - [employer] PCP     Review of Systems   All other systems reviewed and are negative.      Objective   Vitals:  /60   Pulse 64   Ht 1.499 m (4' 11\")   Wt 89.8 kg (198 lb)   BMI 39.99 kg/m²       Physical Exam  Constitutional:       Appearance: She is obese.     Lifestyle Recommendations  I recommend a whole-food plant-based diet, an eating pattern that encourages the consumption of unrefined plant foods (such as fruits, vegetables, tubers, whole grains, legumes, nuts and seeds) and discourages meats, dairy products, eggs and processed foods.     The AHA/ACC recommends that the patient consume a dietary pattern that emphasizes intake of vegetables, fruits, and whole grains; includes low-fat dairy products, poultry, fish, legumes, non-tropical vegetable oils, and nuts; and limits intake of sodium, sweets, sugar-sweetened beverages, and red meats.  Adapt this dietary pattern to appropriate calorie requirements (a 500-750 kcal/day deficit to loose weight), personal and cultural food preferences, and nutrition therapy for other medical conditions (including diabetes).  Achieve this pattern by following plans such as the [place] Mediterranean, DASH dietary pattern, or AHA diet.     Engage in 2 hours and 30 minutes per week of moderate-intensity physical activity, or 1 hour and 15 minutes (75 minutes) per week of vigorous-intensity aerobic physical activity, or an equivalent combination of moderate and vigorous-intensity aerobic physical " activity. Aerobic activity should be performed in episodes of at least 10 minutes preferably spread throughout the week.     Adhering to a heart healthy diet, regular exercise habits, avoidance of tobacco products, and maintenance of a healthy weight are crucial components of their heart disease risk reduction.  This may not meet the criteria for a clinical depression diagnosis. Symptoms were reviewed with Purnima.  Follow-up within the next 3 months is recommended to re-assess symptoms and monitor mental health status.     Assessment & Plan  Type 2 diabetes mellitus with hyperglycemia, without long-term current use of insulin  Slight worsening of hemoglobin A1c from last evaluation 7.6 with fasting blood sugar 153 currently with diet control at this time dealing with grief and death of  reevaluate 6 months  Orders:    Follow Up In Advanced Primary Care [facility] - Medicare Annual    Comprehensive Metabolic Panel; Future    Hemoglobin A1C; Future    Lipid Panel; Future    Albumin-Creatinine Ratio, Urine Random; Future    Referral to Podiatry; Future    XR DEXA bone density; Future    Comprehensive Metabolic Panel; Future    Hemoglobin A1C; Future    Lipid Panel; Future    Albumin-Creatinine Ratio, Urine Random; Future    Hypertensive heart disease with chronic diastolic congestive heart failure  Stable on carvedilol 25 mg twice a day with irbesartan 50 mg daily and amlodipine 2.5 mg daily no evidence of microalbuminuria  Orders:    Follow Up In Advanced Primary Care - [facility] - Medicare Annual    Benign essential hypertension  Good blood pressure control on amlodipine carvedilol and irbesartan continue  Orders:    amLODIPine (Norvasc) 2.5 mg tablet; Take 1 tablet (2.5 mg) by mouth once daily.    carvedilol (Coreg) 25 mg tablet; Take 1 tablet (25 mg) by mouth 2 times daily (morning and late afternoon).    irbesartan (Avapro) 150 mg tablet; Take 1 tablet (150 mg) by mouth once daily.    Severe episode of recurrent  major depressive disorder, without psychotic features (Multi)  Stable on duloxetine 60 mg daily refilled  Orders:    DULoxetine (Cymbalta) 60 mg DR capsule; Take 1 capsule (60 mg) by mouth once daily.    Gastroesophageal reflux disease without esophagitis  Stable on pantoprazole 40 mg twice a day refilled  Orders:    pantoprazole (ProtoNix) 40 mg EC tablet; Take 1 tablet (40 mg) by mouth 2 times a day.    Dyslipidemia, goal LDL below 100  Continue pravastatin 40 mg daily with LDL cholesterol optimal at 66 triglyceride level 109 HDL 33 continue  Orders:    pravastatin (Pravachol) 40 mg tablet; Take 1 tablet (40 mg) by mouth once daily.    Major depression in remission (CMS-HCC)  Continue sertraline 150 mg capsule daily but  Orders:    sertraline 150 mg capsule; Take 1 capsule (150 mg) by mouth once daily at bedtime.    Medicare annual wellness visit, subsequent  Discussed advanced medical directives given advanced medical directive toolkit to discuss with her family currently son is advanced medical power of  since 's death       Dyslipidemia associated with type 2 diabetes mellitus  LDL cholesterol optimal at 66 continue pravastatin 40 mg daily  Orders:    Comprehensive Metabolic Panel; Future    Hemoglobin A1C; Future    Lipid Panel; Future    Albumin-Creatinine Ratio, Urine Random; Future    XR DEXA bone density; Future    Comprehensive Metabolic Panel; Future    Hemoglobin A1C; Future    Lipid Panel; Future    Albumin-Creatinine Ratio, Urine Random; Future    Colon cancer screening  Cologuard screening written for patient  Orders:    Cologuard® colon cancer screening; Future    Screening mammogram for breast cancer  Order screening mammogram  Orders:    BI mammo bilateral screening tomosynthesis; Future    Cryptogenic cirrhosis (Multi)  Well compensated no signs of decompensated cirrhosis at this time       Class 2 severe obesity due to excess calories with serious comorbidity and body mass index  (BMI) of 39.0 to 39.9 in adult  The patient received Current weight: 89.8 kg (198 lb)  Weight change since last visit (-) denotes wt loss 3.8 lbs   Weight loss needed to achieve BMI 25: 74.5 Lbs  Weight loss needed to achieve BMI 30: 49.8 Lbs    Provided instructions on dietary changes  Provided instructions on exercise  Advised to Increase physical activity because they have an above normal BMI.        Routine general medical examination at health care facility  Up-to-date with vaccinations and screenings  Orders:    1 Year Follow Up In Advanced Primary Care - PCP - Wellness Exam; Future    Postmenopausal bone loss  Follow-up on results of bone density ordered today  Orders:    XR DEXA bone density; Future           Advance Directives Discussion  16 - 20 minutes were spent discussing Advanced Care Planning (including a Living Will, Medical Power Of , as well as specific end of life choices and/or directives). The details of that discussion were documented in Advanced Directives Discussion section of the medical record.     Cardiac Risk Assessment  15 - 20 minutes were spent discussing Cardiovascular risk and, if needed, lifestyle modifications were recommended, including nutritional choices, exercise, and elimination of habits contributing to risk.   Aspirin use/disuse was discussed following the guidelines below:  low dose ASA ( mg) should be considered:    If prior Heart Attack/Stroke/Peripheral vascular disease:  Generally recommend daily low dose aspirin unless extremely high bleeding risk (e.g., gastrointestinal).    If no prior Heart Attack/Stroke/Peripheral vascular disease:              Age over 70: Do not use Aspirin for prevention    Age less than 70 and 10-year cardiovascular disease risk is >20%: use low dose Aspirin for prevention.                 Depression Screening  5 - 10 minutes were spent screening for depression.    Obesity Counseling  15 - 20 minutes were spent counseling on diet  and exercise interventions to address obesity and weight reduction.    Patient was identified as a fall risk. Risk prevention instructions provided.

## 2025-05-13 NOTE — PROGRESS NOTES
"Subjective   Reason for Visit: Purnima Puente is an 74 y.o. female here for a Medicare Wellness visit.          Reviewed all medications by prescribing practitioner or clinical pharmacist (such as prescriptions, OTCs, herbal therapies and supplements) and documented in the medical record.    HPI    Patient Care Team:  Chevy Onofre DO as PCP - General  Chevy Onofre DO as PCP - [employer] PCP     Review of Systems    Objective   Vitals:  /60   Pulse 64   Ht 1.499 m (4' 11\")   Wt 89.8 kg (198 lb)   BMI 39.99 kg/m²       Physical Exam    Assessment & Plan  Type 2 diabetes mellitus with hyperglycemia, without long-term current use of insulin    Orders:    Follow Up In Advanced [facility] Care - PCP - Medicare Annual    Hypertensive heart disease with chronic diastolic congestive heart failure    Orders:    Follow Up In Advanced Primary Care - PCP - Medicare Annual    Benign essential hypertension         Severe episode of recurrent major depressive disorder, without psychotic features (Multi)         Gastroesophageal reflux disease without esophagitis         Dyslipidemia, goal LDL below 100         Major depression in remission (CMS-[facility])                   "

## 2025-05-13 NOTE — ASSESSMENT & PLAN NOTE
Discussed advanced medical directives given advanced medical directive toolkit to discuss with her family currently son is advanced medical power of  since 's death

## 2025-05-13 NOTE — ASSESSMENT & PLAN NOTE
The patient received Current weight: 89.8 kg (198 lb)  Weight change since last visit (-) denotes wt loss 3.8 lbs   Weight loss needed to achieve BMI 25: 74.5 Lbs  Weight loss needed to achieve BMI 30: 49.8 Lbs    Provided instructions on dietary changes  Provided instructions on exercise  Advised to Increase physical activity because they have an above normal BMI.

## 2025-05-13 NOTE — TELEPHONE ENCOUNTER
Pharmacy asking if sertraline 150 mg capsule can be changed to tablets? Insurance will not cover capsules.

## 2025-05-13 NOTE — ASSESSMENT & PLAN NOTE
Up-to-date with vaccinations and screenings  Orders:    1 Year Follow Up In Advanced Primary Care - PCP - Wellness Exam; Future

## 2025-05-13 NOTE — ASSESSMENT & PLAN NOTE
Stable on pantoprazole 40 mg twice a day refilled  Orders:    pantoprazole (ProtoNix) 40 mg EC tablet; Take 1 tablet (40 mg) by mouth 2 times a day.

## 2025-05-13 NOTE — ASSESSMENT & PLAN NOTE
Continue sertraline 150 mg capsule daily but  Orders:    sertraline 150 mg capsule; Take 1 capsule (150 mg) by mouth once daily at bedtime.

## 2025-05-13 NOTE — ASSESSMENT & PLAN NOTE
Stable on carvedilol 25 mg twice a day with irbesartan 50 mg daily and amlodipine 2.5 mg daily no evidence of microalbuminuria  Orders:    Follow Up In Advanced Primary Care - PCP - Medicare Annual

## 2025-05-13 NOTE — ASSESSMENT & PLAN NOTE
Slight worsening of hemoglobin A1c from last evaluation 7.6 with fasting blood sugar 153 currently with diet control at this time dealing with grief and death of  reevaluate 6 months  Orders:    Follow Up In [facility] - PCP - Medicare Annual    Comprehensive Metabolic Panel; Future    Hemoglobin A1C; Future    Lipid Panel; Future    Albumin-Creatinine Ratio, Urine Random; Future    Referral to Podiatry; Future    XR DEXA bone density; Future    Comprehensive Metabolic Panel; Future    Hemoglobin A1C; Future    Lipid Panel; Future    Albumin-Creatinine Ratio, Urine Random; Future

## 2025-05-13 NOTE — ASSESSMENT & PLAN NOTE
LDL cholesterol optimal at 66 continue pravastatin 40 mg daily  Orders:    Comprehensive Metabolic Panel; Future    Hemoglobin A1C; Future    Lipid Panel; Future    Albumin-Creatinine Ratio, Urine Random; Future    XR DEXA bone density; Future    Comprehensive Metabolic Panel; Future    Hemoglobin A1C; Future    Lipid Panel; Future    Albumin-Creatinine Ratio, Urine Random; Future

## 2025-05-14 DIAGNOSIS — F33.2 SEVERE EPISODE OF RECURRENT MAJOR DEPRESSIVE DISORDER, WITHOUT PSYCHOTIC FEATURES (MULTI): ICD-10-CM

## 2025-05-14 RX ORDER — SERTRALINE HYDROCHLORIDE 50 MG/1
50 TABLET, FILM COATED ORAL DAILY
Qty: 90 TABLET | Refills: 3 | Status: SHIPPED | OUTPATIENT
Start: 2025-05-14 | End: 2026-05-14

## 2025-05-14 RX ORDER — SERTRALINE HYDROCHLORIDE 100 MG/1
100 TABLET, FILM COATED ORAL DAILY
Qty: 90 TABLET | Refills: 3 | Status: SHIPPED | OUTPATIENT
Start: 2025-05-14 | End: 2026-05-14

## 2025-08-22 ENCOUNTER — APPOINTMENT (OUTPATIENT)
Dept: RADIOLOGY | Facility: HOSPITAL | Age: 75
End: 2025-08-22
Payer: MEDICARE

## 2025-08-22 VITALS — WEIGHT: 198 LBS | BODY MASS INDEX: 39.92 KG/M2 | HEIGHT: 59 IN

## 2025-08-22 DIAGNOSIS — Z12.31 SCREENING MAMMOGRAM FOR BREAST CANCER: ICD-10-CM

## 2025-08-22 PROCEDURE — 77067 SCR MAMMO BI INCL CAD: CPT

## 2025-08-22 PROCEDURE — 77063 BREAST TOMOSYNTHESIS BI: CPT

## 2025-10-13 ENCOUNTER — APPOINTMENT (OUTPATIENT)
Dept: CARDIOLOGY | Facility: HOSPITAL | Age: 75
End: 2025-10-13
Payer: COMMERCIAL

## 2025-11-13 ENCOUNTER — APPOINTMENT (OUTPATIENT)
Dept: PRIMARY CARE | Facility: CLINIC | Age: 75
End: 2025-11-13
Payer: MEDICARE

## 2026-05-13 ENCOUNTER — APPOINTMENT (OUTPATIENT)
Dept: PRIMARY CARE | Facility: CLINIC | Age: 76
End: 2026-05-13
Payer: MEDICARE